# Patient Record
Sex: MALE | Race: WHITE | NOT HISPANIC OR LATINO | Employment: OTHER | URBAN - METROPOLITAN AREA
[De-identification: names, ages, dates, MRNs, and addresses within clinical notes are randomized per-mention and may not be internally consistent; named-entity substitution may affect disease eponyms.]

---

## 2022-06-30 ENCOUNTER — TRANSFERRED RECORDS (OUTPATIENT)
Dept: HEALTH INFORMATION MANAGEMENT | Facility: CLINIC | Age: 77
End: 2022-06-30

## 2022-07-25 ENCOUNTER — TRANSFERRED RECORDS (OUTPATIENT)
Dept: HEALTH INFORMATION MANAGEMENT | Facility: CLINIC | Age: 77
End: 2022-07-25

## 2022-08-03 ENCOUNTER — TELEPHONE (OUTPATIENT)
Dept: ORTHOPEDICS | Facility: CLINIC | Age: 77
End: 2022-08-03

## 2022-08-03 ENCOUNTER — MEDICAL CORRESPONDENCE (OUTPATIENT)
Dept: HEALTH INFORMATION MANAGEMENT | Facility: CLINIC | Age: 77
End: 2022-08-03

## 2022-08-03 NOTE — TELEPHONE ENCOUNTER
Action August 10, 2022 2:00 PM MT   Action Taken Pathology slides received and sent to pathology lab.     Action August 4, 2022 9:06 AM MT   Action Taken Sent a request for pathology slides from  [facility] [phone] with [employer] (Tracking #: [id number] )   Sent a request to  [phone] for images.     8/3/2022 4:21pm Fax request sent to [facility] ([phone]) for med recs. -[facility]     4:36pm Fax request sent to [facility] ([phone]) for images. 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 Watson ([facility])    APPOINTMENT DATE: 8/8/2022, 9 AM    NOTES STATUS DETAILS   OFFICE NOTE from referring provider Care Everywhere/Media Tab Tre Henson MD  - [facility]   LABS     CBC/DIFF Care Everywhere 7/12/2022   CULTURES Care Everywhere 7/9/2022   MRI PACS MR Radius Ulna Left: 6/13/2022 ([facility])      ULTRASOUND PACS US Upper Extremity Left Soft Tissue: 6/6/2022 ([facility])      TUMOR     PATHOLOGY  Slides & report Received: 08/10/2022 [facility]  Case: [id number]       Collected: 07/25/2022  Specimen: Arm, left, Left forearm mass

## 2022-08-03 NOTE — TELEPHONE ENCOUNTER
[facility] Call Center    Phone Message    May a detailed message be left on voicemail: yes     Reason for Call: Appointment Intake    Referring Provider Name: Dr. Tre Henson MD @ [facility]  Diagnosis and/or Symptoms: L Forearm Sarcoma     Action Taken: Message routed to:  [facility] ortho    Travel Screening: Not Applicable     Records and images are being sent , one received please evaluate to determine if pt needs to be seen sooner

## 2022-08-03 NOTE — TELEPHONE ENCOUNTER
ATC LV @ home phone number to schedule appt. Provided call center number.     ATC spoke to pt who was unaware of his diagnosis. Pt confirmed appt date/time/location. ATC mailed appt reminder to address on file.     Pt had biopsy diagnosis of Sarcoma, pt needs to be seen ASAP. Pt denied appt on 8/4/22 due to 'not discussing with the referring provider yet'.     Steffanie Cloud ATC

## 2022-08-05 ENCOUNTER — TRANSCRIBE ORDERS (OUTPATIENT)
Dept: OTHER | Age: 77
End: 2022-08-05

## 2022-08-05 DIAGNOSIS — R22.30: Primary | ICD-10-CM

## 2022-08-05 DIAGNOSIS — R22.30 ARM MASS: Primary | ICD-10-CM

## 2022-08-08 ENCOUNTER — OFFICE VISIT (OUTPATIENT)
Dept: ORTHOPEDICS | Facility: CLINIC | Age: 77
End: 2022-08-08
Payer: MEDICARE

## 2022-08-08 ENCOUNTER — ANCILLARY PROCEDURE (OUTPATIENT)
Dept: GENERAL RADIOLOGY | Facility: CLINIC | Age: 77
End: 2022-08-08
Attending: ORTHOPAEDIC SURGERY
Payer: MEDICARE

## 2022-08-08 ENCOUNTER — TELEPHONE (OUTPATIENT)
Dept: ORTHOPEDICS | Facility: CLINIC | Age: 77
End: 2022-08-08

## 2022-08-08 ENCOUNTER — PRE VISIT (OUTPATIENT)
Dept: ORTHOPEDICS | Facility: CLINIC | Age: 77
End: 2022-08-08

## 2022-08-08 VITALS — HEIGHT: 71 IN | WEIGHT: 270 LBS | BODY MASS INDEX: 37.8 KG/M2

## 2022-08-08 DIAGNOSIS — R22.30 ARM MASS: ICD-10-CM

## 2022-08-08 DIAGNOSIS — R22.32 MASS OF LEFT FOREARM: Primary | ICD-10-CM

## 2022-08-08 DIAGNOSIS — C96.4: ICD-10-CM

## 2022-08-08 LAB
APPEARANCE FLD: ABNORMAL
CELL COUNT BODY FLUID SOURCE: ABNORMAL
COLOR FLD: ABNORMAL
EOSINOPHIL NFR FLD MANUAL: 1 %
LYMPHOCYTES NFR FLD MANUAL: 73 %
MONOS+MACROS NFR FLD MANUAL: 21 %
NEUTS BAND NFR FLD MANUAL: 5 %
WBC # FLD AUTO: 879 /UL

## 2022-08-08 PROCEDURE — 87070 CULTURE OTHR SPECIMN AEROBIC: CPT | Performed by: ORTHOPAEDIC SURGERY

## 2022-08-08 PROCEDURE — 20605 DRAIN/INJ JOINT/BURSA W/O US: CPT | Mod: LT | Performed by: ORTHOPAEDIC SURGERY

## 2022-08-08 PROCEDURE — 73090 X-RAY EXAM OF FOREARM: CPT | Mod: LT | Performed by: RADIOLOGY

## 2022-08-08 PROCEDURE — 99203 OFFICE O/P NEW LOW 30 MIN: CPT | Mod: 25 | Performed by: ORTHOPAEDIC SURGERY

## 2022-08-08 PROCEDURE — 87075 CULTR BACTERIA EXCEPT BLOOD: CPT | Performed by: ORTHOPAEDIC SURGERY

## 2022-08-08 PROCEDURE — 89051 BODY FLUID CELL COUNT: CPT | Performed by: ORTHOPAEDIC SURGERY

## 2022-08-08 RX ORDER — MULTIPLE VITAMINS W/ MINERALS TAB 9MG-400MCG
1 TAB ORAL DAILY
COMMUNITY

## 2022-08-08 RX ORDER — ASPIRIN 81 MG/1
TABLET, CHEWABLE ORAL
COMMUNITY
End: 2022-08-22

## 2022-08-08 RX ORDER — LANSOPRAZOLE 30 MG/1
30 CAPSULE, DELAYED RELEASE ORAL DAILY
COMMUNITY

## 2022-08-08 NOTE — PROGRESS NOTES
[facility], Orthopaedic Oncology Surgery Consultation  by Velasquez Chawla M.D.    Jose Tiwari MRN# [id number]    YOB: 1945     Requesting physician: Tre Mcintyre            Assessment and Plan:   Assessment:  76yo M with L forearm pleomorphic sarcoma, s/p excision with positive margins (7/25/2022)     Plan:  1. Obtain pathology slides (Kayla to joycein)  2. PET-CT scan (Nevaeh to schedule)  3. Consult to radiation oncology ([facility] to schedule)  4. If PET-CT is negative for mets, will plan for wide re-excision a month after radiation  5. Will probably need skin graft after re-excision. We should arrange for plastic surgery consult during interim before next surgery.  6. Aspiration of the left forearm performed today    PROCEDURE DATE: 8/8/2022    PROCEDURE NOTE:    After obtaining informed consent, under sterile precautions, the left forearm was aspirated.  There were no complications.  15 ml of body fluid was obtained and sent for analysis.    Bryanna Mi MD  Adult Recon Surgery Fellow    Attending MD (Dr. Velasquez Chawla) Attestation :  This patient was seen and evaluated by me including a history, exam, and interpretation of all imaging and/or lab data.  I formulated the treatment plan along with either a physician's assistant (PA-C) or training physician (resident/fellow), who also saw the patient. That individual or a scribe has documented the visit in the attached note which I approve.  I also performed the procedure.      MD Abner Tucker Professor  Oncology and Adult Reconstructive Surgery  Dept Orthopaedic Surgery, [facility]  [phone] office, [phone] pager  [email or web address]                 History of Present Illness:   77 year old RHD retired male presents to the office for the first time today for an evaluation of left forearm painless mass. The patient states that the mass has been around for about 6 months to 1 year, without  previous injury. Dr. Henson excised the mass on 7/25/2022. 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   Currently, the patient has only incisional pain, with no functional deficits.     The patient denies fevers/chills. Denies numbness/tingling/weakness. Denies weight loss. 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      Current symptoms:  Problem: Left forearm mass  Onset and duration: 6mo -1yr  Awakens from sleep due to sx's:  No  Precipitating Injury:  No    Other joints or sites painful:  No  Fever: No  Appetite change or weight loss: No  History of prior or existing cancer: Yes      TREATMENTS:  1. 7/25/2022: Left forearm mass excision (Dr. 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 8/8/2022: Left forearm aspiration             Physical Exam:     EXAMINATION pertinent findings:   PSYCH: Pleasant, healthy-appearing, alert, oriented x3, cooperative. Normal mood and affect.  VITAL SIGNS: There were no vitals taken for this visit..  Reviewed nursing intake notes.   There is no height or weight on file to calculate BMI.  RESP: non labored breathing   ABD: benign, soft, non-tender, no acute peritoneal findings  SKIN: grossly normal   LYMPHATIC: grossly normal, no adenopathy, no extremity edema  NEURO: grossly normal , no motor deficits  VASCULAR: satisfactory perfusion of all extremities   MUSCULOSKELETAL:   LUE:  Skin intact. Incision well-healing  There is still a mass in the mid-forearm around the ulna  No TTP or erythema  Full elbow and wrist ROM  Fires AIN/PIN/U  SILT M/R/U  WWP                       Data:   All laboratory data reviewed  All imaging studies reviewed by me  MRI of the left forearm from 6/13/2022 reviewed in the office showing soft tissue mass around the mid-ulna.         DATA for DOCUMENTATION:         Past Medical History:   There is no problem list on file for  this patient.    No past medical history on file.    Also see scanned health assessment forms.       Past Surgical History:   No past surgical history on file.         Social History:     Social History     Socioeconomic History     Marital status:      Spouse name: Not on file     Number of children: Not on file     Years of education: Not on file     Highest education level: Not on file   Occupational History     Not on file   Tobacco Use     Smoking status: Not on file     Smokeless tobacco: Not on file   Substance and Sexual Activity     Alcohol use: Not on file     Drug use: Not on file     Sexual activity: Not on file   Other Topics Concern     Not on file   Social History Narrative     Not on file     Social Determinants of Health     Financial Resource Strain: Not on file   Food Insecurity: Not on file   Transportation Needs: Not on file   Physical Activity: Not on file   Stress: Not on file   Social Connections: Not on file   Intimate Partner Violence: Not on file   Housing Stability: Not on file            Family History:     No family history on file.         Medications:     Current Outpatient Medications   Medication Sig     aspirin (ASA) 81 MG chewable tablet      Aspirin 81 MG CAPS Take 1 tablet by mouth daily     LANsoprazole (PREVACID) 30 MG DR capsule      Misc Natural Products (OSTEO BI-FLEX ADV TRIPLE ST PO)      multivitamin w/minerals (MULTI-VITAMIN) tablet      No current facility-administered medications for this visit.              Review of Systems:   A comprehensive 10 point review of systems (constitutional, ENT, cardiac, peripheral vascular, lymphatic, respiratory, GI, , Musculoskeletal, skin, Neurological) was performed and found to be negative except as described in this note.     See intake form completed by patient    Answers for HPI/ROS submitted by the patient on 8/7/2022  General Symptoms: No  Skin Symptoms: No  HENT Symptoms: No  EYE SYMPTOMS: No  HEART SYMPTOMS:  No  LUNG SYMPTOMS: No  INTESTINAL SYMPTOMS: No  URINARY SYMPTOMS: No  REPRODUCTIVE SYMPTOMS: No  SKELETAL SYMPTOMS: No  BLOOD SYMPTOMS: No  NERVOUS SYSTEM SYMPTOMS: No  MENTAL HEALTH SYMPTOMS: No    Ki Mi MD  Adult Reconstruction Fellow

## 2022-08-08 NOTE — TELEPHONE ENCOUNTER
----- Message from Kayla May RN sent at 8/8/2022  2:18 PM CDT -----  Regarding: Plastic Surgery Consult Needed  Daniel Herring,    Could you please enter an order for a Plastic Surgery Referral.  Reason for visit: Patient will probably need skin graft after re-excision.  Patient with a history of a Left forearm pleomorphic sarcoma, s/p excision with positive margins (7/25/2022)    Thank you :)  Kayla

## 2022-08-08 NOTE — LETTER
8/8/2022         RE: Jose Tiwari  [address] [facility] [address]        Dear Colleague,    Thank you for referring your patient, Jose Tiwari, to the [facility] [place]. Please see a copy of my visit note below.        [facility] Phys[facility], Orthopaedic Oncology Surgery Consultation  by Velasquez Chawla M.D.    Jose Tiwari MRN# [id number]    YOB: 1945     Requesting physician: Tre Mcintyre            Assessment and Plan:   Assessment:  76yo M with L forearm pleomorphic sarcoma, s/p excision with positive margins (7/25/2022)     Plan:  1. Obtain pathology slides (Kayla to cristina)  2. PET-CT scan (Nevaeh to schedule)  3. Consult to radiation oncology ([facility] to schedule)  4. If PET-CT is negative for mets, will plan for wide re-excision a month after radiation  5. Will probably need skin graft after re-excision. We should arrange for plastic surgery consult during interim before next surgery.  6. Aspiration of the left forearm performed today    PROCEDURE DATE: 8/8/2022    PROCEDURE NOTE:    After obtaining informed consent, under sterile precautions, the left forearm was aspirated.  There were no complications.  15 ml of body fluid was obtained and sent for analysis.    Bryanna Mi MD  Adult Recon Surgery Fellow    Attending MD (Dr. Velasquez Chawla) Attestation :  This patient was seen and evaluated by me including a history, exam, and interpretation of all imaging and/or lab data.  I formulated the treatment plan along with either a physician's assistant (PATANI) or training physician (resident/fellow), who also saw the patient. That individual or a scribe has documented the visit in the attached note which I approve.  I also performed the procedure.      MD Abner Tucker Family Professor  Oncology and Adult Reconstructive Surgery  Dept Orthopaedic Surgery, [facility]  [phone] office, [phone] pager  [email or web address]                  History of Present Illness:   77 year old RHD retired male presents to the office for the first time today for an evaluation of left forearm painless mass. The patient states that the mass has been around for about 6 months to 1 year, without previous injury. Dr. Henson excised the mass on 7/25/2022. 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   Currently, the patient has only incisional pain, with no functional deficits.     The patient denies fevers/chills. Denies numbness/tingling/weakness. Denies weight loss. 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      Current symptoms:  Problem: Left forearm mass  Onset and duration: 6mo -1yr  Awakens from sleep due to sx's:  No  Precipitating Injury:  No    Other joints or sites painful:  No  Fever: No  Appetite change or weight loss: No  History of prior or existing cancer: Yes      TREATMENTS:  1. 7/25/2022: Left forearm mass excision (Dr. 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 8/8/2022: Left forearm aspiration             Physical Exam:     EXAMINATION pertinent findings:   PSYCH: Pleasant, healthy-appearing, alert, oriented x3, cooperative. Normal mood and affect.  VITAL SIGNS: There were no vitals taken for this visit..  Reviewed nursing intake notes.   There is no height or weight on file to calculate BMI.  RESP: non labored breathing   ABD: benign, soft, non-tender, no acute peritoneal findings  SKIN: grossly normal   LYMPHATIC: grossly normal, no adenopathy, no extremity edema  NEURO: grossly normal , no motor deficits  VASCULAR: satisfactory perfusion of all extremities   MUSCULOSKELETAL:   LUE:  Skin intact. Incision well-healing  There is still a mass in the mid-forearm around the ulna  No TTP or erythema  Full elbow and wrist ROM  Fires AIN/PIN/U  SILT M/R/U  WWP                       Data:   All laboratory data  reviewed  All imaging studies reviewed by me  MRI of the left forearm from 6/13/2022 reviewed in the office showing soft tissue mass around the mid-ulna.         DATA for DOCUMENTATION:         Past Medical History:   There is no problem list on file for this patient.    No past medical history on file.    Also see scanned health assessment forms.       Past Surgical History:   No past surgical history on file.         Social History:     Social History     Socioeconomic History     Marital status:      Spouse name: Not on file     Number of children: Not on file     Years of education: Not on file     Highest education level: Not on file   Occupational History     Not on file   Tobacco Use     Smoking status: Not on file     Smokeless tobacco: Not on file   Substance and Sexual Activity     Alcohol use: Not on file     Drug use: Not on file     Sexual activity: Not on file   Other Topics Concern     Not on file   Social History Narrative     Not on file     Social Determinants of Health     Financial Resource Strain: Not on file   Food Insecurity: Not on file   Transportation Needs: Not on file   Physical Activity: Not on file   Stress: Not on file   Social Connections: Not on file   Intimate Partner Violence: Not on file   Housing Stability: Not on file            Family History:     No family history on file.         Medications:     Current Outpatient Medications   Medication Sig     aspirin (ASA) 81 MG chewable tablet      Aspirin 81 MG CAPS Take 1 tablet by mouth daily     LANsoprazole (PREVACID) 30 MG DR capsule      Misc Natural Products (OSTEO BI-FLEX ADV TRIPLE ST PO)      multivitamin w/minerals (MULTI-VITAMIN) tablet      No current facility-administered medications for this visit.              Review of Systems:   A comprehensive 10 point review of systems (constitutional, ENT, cardiac, peripheral vascular, lymphatic, respiratory, GI, , Musculoskeletal, skin, Neurological) was performed and  found to be negative except as described in this note.     See intake form completed by patient    Answers for HPI/ROS submitted by the patient on 8/7/2022  General Symptoms: No  Skin Symptoms: No  HENT Symptoms: No  EYE SYMPTOMS: No  HEART SYMPTOMS: No  LUNG SYMPTOMS: No  INTESTINAL SYMPTOMS: No  URINARY SYMPTOMS: No  REPRODUCTIVE SYMPTOMS: No  SKELETAL SYMPTOMS: No  BLOOD SYMPTOMS: No  NERVOUS SYSTEM SYMPTOMS: No  MENTAL HEALTH SYMPTOMS: No    Ki Mi MD  Adult Reconstruction Fellow      Again, thank you for allowing me to participate in the care of your patient.        Sincerely,        Velasquez Chawla MD

## 2022-08-08 NOTE — LETTER
8/8/2022       RE: Jose Tiwari  [address] [facility] [address]    Dear Colleague,    Thank you for referring your patient, Jose Tiwari, to the [facility] [place]. Please see a copy of my visit note below.        [facility], Orthopaedic Oncology Surgery Consultation  by Velasquez Chawla M.D.    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 Obtain pathology slides (Kayla to cristina)  2. PET-CT scan (Nevaeh to schedule)  3. Consult to radiation oncology ([facility] to schedule)  4. If PET-CT is negative for mets, will plan for wide re-excision a month after radiation  5. Will probably need skin graft after re-excision. We should arrange for plastic surgery consult during interim before next surgery.  6. Aspiration of the left forearm performed today    PROCEDURE DATE: 8/8/2022    PROCEDURE NOTE:    After obtaining informed consent, under sterile precautions, the left forearm was aspirated.  There were no complications.  15 ml of body fluid was obtained and sent for analysis.    Bryanna Mi MD  Adult Recon Surgery Fellow    Attending MD (Dr. Velasquez Chawla) Attestation :  This patient was seen and evaluated by me including a history, exam, and interpretation of all imaging and/or lab data.  I formulated the treatment plan along with either a physician's assistant (PATANI) or training physician (resident/fellow), who also saw the patient. That individual or a scribe has documented the visit in the attached note which I approve.  I also performed the procedure.      MD Abner Tucker Family Professor  Oncology and Adult Reconstructive Surgery  Dept Orthopaedic Surgery, [facility]  [phone] office, [phone] pager  [email or web address]                 General Surgery  Hospital Follow-Up Office Visit    CC: Follow-up acute sigmoid diverticulitis    HPI: The patient is a pleasant 39 y.o. year-old gentleman who presents today for follow-up of a recent hospitalization for acute sigmoid diverticulitis that required hospitalization for about 48 hours.  That was his first episode of diverticulitis, and involved the mid sigmoid colon which was located in the right lower quadrant.  The right lower quadrant pains he had been having prior to admission have completely resolved and he denies any trouble voiding or defecating.  He denies any nausea, vomiting, fevers, or chills.    Past Medical History:   Factor V Leiden     Past Surgical History:   Tonsillectomy  Hand surgery     Medications: Xarelto 20 mg daily     Allergies: No known drug allergies     Social History: , nonsmoker, no regular alcohol use, works as the  for [employer]     Family History: Maternal grandmother with colon cancer, no family history of inflammatory bowel disease    ROS:   Constitutional: Negative for fevers or chills  HENT: Negative for hearing loss or runny nose  Eyes: Negative for vision changes or scleral icterus  Respiratory: Negative for cough or shortness of breath  Cardiovascular: Negative for chest pain or heart palpitations  Gastrointestinal: Negative for abdominal pain, nausea, vomiting, constipation, melena, or hematochezia  Genitourinary: Negative for hematuria or dysuria  Musculoskeletal: Negative for joint pain or back pain  Neurologic: Negative for headaches or dizziness  Psychiatric: Negative for anxiety or depression  All other systems reviewed and negative    Physical Exam:  Vitals:    12/11/18 1028   Pulse: 74   SpO2: 98%     General: No acute distress, well-nourished & well-developed  HEAD: normocephalic, atraumatic  EYES: normal conjunctiva, sclera anicteric  EARS: grossly normal hearing  NECK: supple, no thyromegaly  CARDIOVASCULAR: regular rate and   History of Present Illness:   77 year old RHD retired male presents to the office for the first time today for an evaluation of left forearm painless mass. The patient states that the mass has been around for about 6 months to 1 year, without previous injury. Dr. Henson excised the mass on 7/25/2022. 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   Currently, the patient has only incisional pain, with no functional deficits.     The patient denies fevers/chills. Denies numbness/tingling/weakness. Denies weight loss. 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      Current symptoms:  Problem: Left forearm mass  Onset and duration: 6mo -1yr  Awakens from sleep due to sx's:  No  Precipitating Injury:  No    Other joints or sites painful:  No  Fever: No  Appetite change or weight loss: No  History of prior or existing cancer: Yes      TREATMENTS:  1. 7/25/2022: Left forearm mass excision (Dr. 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 8/8/2022: Left forearm aspiration             Physical Exam:     EXAMINATION pertinent findings:   PSYCH: Pleasant, healthy-appearing, alert, oriented x3, cooperative. Normal mood and affect.  VITAL SIGNS: There were no vitals taken for this visit..  Reviewed nursing intake notes.   There is no height or weight on file to calculate BMI.  RESP: non labored breathing   ABD: benign, soft, non-tender, no acute peritoneal findings  SKIN: grossly normal   LYMPHATIC: grossly normal, no adenopathy, no extremity edema  NEURO: grossly normal , no motor deficits  VASCULAR: satisfactory perfusion of all extremities   MUSCULOSKELETAL:   LUE:  Skin intact. Incision well-healing  There is still a mass in the mid-forearm around the ulna  No TTP or erythema  Full elbow and wrist ROM  Fires AIN/PIN/U  SILT M/R/U  WWP                       Data:   All laboratory data  rhythm  RESPIRATORY: clear to auscultation bilaterally  GASTROINTESTINAL: soft, nontender, non-distended  MUSCULOSKELETAL: normal gait and station. No gross extremity abnormalities  PSYCHIATRIC: oriented x3, normal mood and affect    IMAGING:  CT ABD/PELVIS (11/13/2018):  1. Within the right lower quadrant, there is an inflamed segment of proximal sigmoid colon, with adjacent pericolonic soft tissue stranding and trace free fluid. Findings are favored to represent diverticulitis. No adjacent fluid collection is seen to suggest perforation with abscess formation. This process immediately abuts some adjacent loops of small bowel, but does not appear to result in any small bowel obstruction. Process is also contiguous with the patient's urinary bladder which does appear thick walled superiorly.  2. No evidence of acute appendicitis.    ASSESSMENT & PLAN  Mr. Fam is a 39-year-old gentleman with a recent episode of acute uncomplicated sigmoid diverticulitis that responded well to conservative management.  I advised him to begin taking a fiber supplement daily and would like to proceed with colonoscopy in about 6-8 weeks.  I discussed the risks of the procedure to include bleeding, possible colon perforation, and possible missed pathology.  Despite these risks, he has consented to proceed and I'm happy to get it scheduled for late January or early February 2019.    Savana Castro MD  General and Endoscopic Surgery  [facility]    [address], [address]  [place], [place], [address]  P: [phone]  F: [phone]      reviewed  All imaging studies reviewed by me  MRI of the left forearm from 6/13/2022 reviewed in the office showing soft tissue mass around the mid-ulna.         DATA for DOCUMENTATION:         Past Medical History:   There is no problem list on file for this patient.    No past medical history on file.    Also see scanned health assessment forms.       Past Surgical History:   No past surgical history on file.         Social History:     Social History     Socioeconomic History     Marital status:      Spouse name: Not on file     Number of children: Not on file     Years of education: Not on file     Highest education level: Not on file   Occupational History     Not on file   Tobacco Use     Smoking status: Not on file     Smokeless tobacco: Not on file   Substance and Sexual Activity     Alcohol use: Not on file     Drug use: Not on file     Sexual activity: Not on file   Other Topics Concern     Not on file   Social History Narrative     Not on file     Social Determinants of Health     Financial Resource Strain: Not on file   Food Insecurity: Not on file   Transportation Needs: Not on file   Physical Activity: Not on file   Stress: Not on file   Social Connections: Not on file   Intimate Partner Violence: Not on file   Housing Stability: Not on file            Family History:     No family history on file.         Medications:     Current Outpatient Medications   Medication Sig     aspirin (ASA) 81 MG chewable tablet      Aspirin 81 MG CAPS Take 1 tablet by mouth daily     LANsoprazole (PREVACID) 30 MG DR capsule      Misc Natural Products (OSTEO BI-FLEX ADV TRIPLE ST PO)      multivitamin w/minerals (MULTI-VITAMIN) tablet      No current facility-administered medications for this visit.          Review of Systems:   A comprehensive 10 point review of systems (constitutional, ENT, cardiac, peripheral vascular, lymphatic, respiratory, GI, , Musculoskeletal, skin, Neurological) was performed and found  to be negative except as described in this note.     See intake form completed by patient    Answers for HPI/ROS submitted by the patient on 8/7/2022  General Symptoms: No  Skin Symptoms: No  HENT Symptoms: No  EYE SYMPTOMS: No  HEART SYMPTOMS: No  LUNG SYMPTOMS: No  INTESTINAL SYMPTOMS: No  URINARY SYMPTOMS: No  REPRODUCTIVE SYMPTOMS: No  SKELETAL SYMPTOMS: No  BLOOD SYMPTOMS: No  NERVOUS SYSTEM SYMPTOMS: No  MENTAL HEALTH SYMPTOMS: No    Ki Mi MD        Adult Reconstruction Fellow

## 2022-08-10 ENCOUNTER — TELEPHONE (OUTPATIENT)
Dept: ORTHOPEDICS | Facility: CLINIC | Age: 77
End: 2022-08-10

## 2022-08-10 NOTE — TELEPHONE ENCOUNTER
-A call was placed to the Radiation Oncology clinic.    -First, tried Jackie, nurse manager, who has been taking care of all new patient referrals, but she is out of the office through 8/11/22.    -Called the main clinic line: [phone] and spoke to someone over the phone who sent a message to Marlene, who is covering for Jackie while she is away.    -A message was sent to Marlene, to have this patient seen ASAP- hopefully within the next 1-2 weeks.    -Will await more information on that.    Kayla May, CHIDI  8/10/2022 2:24 PM

## 2022-08-11 ENCOUNTER — LAB REQUISITION (OUTPATIENT)
Dept: LAB | Facility: CLINIC | Age: 77
End: 2022-08-11
Payer: MEDICARE

## 2022-08-13 LAB — BACTERIA ASPIRATE CULT: NO GROWTH

## 2022-08-15 ENCOUNTER — HOSPITAL ENCOUNTER (OUTPATIENT)
Dept: PET IMAGING | Facility: CLINIC | Age: 77
Discharge: HOME | End: 2022-08-15
Attending: ORTHOPAEDIC SURGERY | Admitting: ORTHOPAEDIC SURGERY
Payer: MEDICARE

## 2022-08-15 DIAGNOSIS — C96.4: ICD-10-CM

## 2022-08-15 DIAGNOSIS — R22.32 MASS OF LEFT FOREARM: ICD-10-CM

## 2022-08-15 LAB — BACTERIA ASPIRATE CULT: NORMAL

## 2022-08-15 PROCEDURE — 343N000001 HC RX 343: Performed by: ORTHOPAEDIC SURGERY

## 2022-08-15 PROCEDURE — 78816 PET IMAGE W/CT FULL BODY: CPT | Mod: 26 | Performed by: RADIOLOGY

## 2022-08-15 PROCEDURE — A9552 F18 FDG: HCPCS | Performed by: ORTHOPAEDIC SURGERY

## 2022-08-15 PROCEDURE — G1010 CDSM STANSON: HCPCS | Mod: PI

## 2022-08-15 PROCEDURE — 250N000011 HC RX IP 250 OP 636: Performed by: ORTHOPAEDIC SURGERY

## 2022-08-15 PROCEDURE — 74177 CT ABD & PELVIS W/CONTRAST: CPT

## 2022-08-15 PROCEDURE — 74177 CT ABD & PELVIS W/CONTRAST: CPT | Mod: 26 | Performed by: RADIOLOGY

## 2022-08-15 PROCEDURE — 71260 CT THORAX DX C+: CPT | Mod: 26 | Performed by: RADIOLOGY

## 2022-08-15 PROCEDURE — G1010 CDSM STANSON: HCPCS | Mod: GC | Performed by: RADIOLOGY

## 2022-08-15 RX ORDER — IOPAMIDOL 755 MG/ML
45-135 INJECTION, SOLUTION INTRAVASCULAR ONCE
Status: COMPLETED | OUTPATIENT
Start: 2022-08-15 | End: 2022-08-15

## 2022-08-15 RX ADMIN — IOPAMIDOL 135 ML: 755 INJECTION, SOLUTION INTRAVENOUS at 15:33

## 2022-08-15 RX ADMIN — FLUDEOXYGLUCOSE F-18 16.15 MCI.: 500 INJECTION, SOLUTION INTRAVENOUS at 13:57

## 2022-08-15 NOTE — PROGRESS NOTES
"Radiation Oncology Consultation:  Date on this visit: 8/17/2022    Jose Tiwari  is referred by Dr.Edward SEAR Chawla for a radiation oncology consultation. He requires evaluation for diagnosis of LEFT forearm high grade sarcoma.       History Of Present Illness:  Mr. Tiwari is a 77 year old  right handed male who presents with a diagnosis of sarcoma.    The patient states that the mass had been around for about 6 months to 1 year, without previous injury.       He underwent Ultrasound and MRI of the lesion.    THe MRI showed a small poorly marginated but measured on the order of 1.4 x 0.9 x 1.7 cm lesion.  ( see image bleow)    Dr. Henson excised the mass on 7/25/2022. 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       He was referred to DR Chawla who has ordered at PET scan  and referred him for possible \"  preoperative \" RT with plan for a re-exision.        Past Medical/Surgical History:  No past medical history on file.  Past Surgical History:   Procedure Laterality Date     KNEE SURGERY  2010    both knees past 15 yrs   Traumatic amputaiton of finger 2009   Tongue cancer tx with surgery  2014  no radiation , no recurrence  GERD        Past Radiation History: none  Past Chemotherapy History:none   Implanted Cardiac device:   none  Advanced directive  :  no    Review of Systems:  Reviewed.  See details in nursing note    Allergies:  Allergies as of 08/17/2022 - Reviewed 08/08/2022   Allergen Reaction Noted     Atorvastatin  01/15/2014     No clinical screening - see comments Unknown 01/15/2014     Omeprazole Other (See Comments) 04/13/2016     Pantoprazole Other (See Comments) 02/12/2016     Ciprofloxacin Rash 09/26/2016     Penicillins Rash 12/23/2008     Sulfa drugs Rash 10/04/2016       Current Medications:     Current Outpatient Medications:      aspirin (ASA) 81 MG chewable tablet, , Disp: , Rfl:      Aspirin 81 MG CAPS, Take 1 tablet by mouth daily, Disp: , Rfl:      LANsoprazole " (PREVACID) 30 MG DR capsule, , Disp: , Rfl:      Misc Natural Products (OSTEO BI-FLEX ADV TRIPLE ST PO), , Disp: , Rfl:      multivitamin w/minerals (THERA-VIT-M) tablet, , Disp: , Rfl:     Family History:  Family History   Problem Relation Age of Onset     Diabetes Mother              Breast Cancer Mother              Obesity Mother              Hypertension Father                Social History:  accomanied by his wife,    no tobacco now,mayra in   occassional etoh      Physical Exam:  BP (!) 147/81 (BP Location: Right arm)   Pulse 86   Resp 16   Wt 98.8 kg (217 lb 12.8 oz)   SpO2 98%   BMI 30.38 kg/m      GENERAL APPEARANCE: healthy, alert and no distress.     HENT: Mouth without ulcers or lesions. RIght lateral tongue with evidence of prior surgery,  Mucosa pink and moist.  Teeth in good repair    NECK: no adenopathy, no asymmetry or masses   LYMPHATICS: No cervical, supraclavicular, axillary or inguinal lymphadenopathy   RESP: lungs clear to auscultation - no rales, rhonchi or wheezes  CARDIOVASCULAR: regular rates and rhythm, normal S1 S2, no S3 or S4 and no murmur.   ABDOMEN:  soft, nontender, no HSM or masses and bowel sounds normal     MUSCULOSKELETAL: left forearm   with 3 cm scar lateral aspect.  Firm area under the scar with cosistency of fluid.   THe borders are  No nodularity.         extremities normal- no gross deformities noted, no evidence of inflammation in joints, FROM in all extremities. No edema b/l LE.  SKIN: no suspicious lesions or rashes   PSYCHIATRIC: mentation appears normal and affect normal      [facility]   Soft tissue, left forearm mass, excision:    Pleomorphic sarcoma, FNCLCC grade 2 of 3 extending to the specimen surgical margins focally      Laboratory/Imaging Studies  Ultrasound   22:  Imaging of the palpable area along the left mid forearm demonstrates a heterogeneous irregular hypoechoic  "area within the deep subcutaneous tissues marginating the muscle with blood flow along the peripheral margin which could be a solid mass measuring 1.9 x 0.7 x 2.5 cm. Further evaluation with MRI imaging with and without contrast is suggested.     CXR:  7/9/22 :  Shallow inspiration with strands of atelectasis or scarring both lungs. Calcified aortic atherosclerosis. Mild degenerative change thoracic spine. No change from prior    CT abd and pelvis   7/9/22:   No acute abnormalities.     MRI of left arm   6/13/22 :   SUBCUTANEOUS   Low T1 and high T2 signal intensity lesion along the ulnar side of the forearm corresponding to the patient's palpable abnormality. This is poorly marginated but measures on the order of 1.4 x 0.9 x 1.7 cm and does exhibit contrast enhancement. It elicits some surrounding inflammation or edema within the subcutaneous soft tissues, but there is no evidence for extension of the lesion or reactive edema deep to the myofascial margin. It remains indeterminate. It could represent a focal area of inflammation such as a granulomatous inflammatory process.      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        Surgery alone ( with another resection ) has been reported to have at least a 40 % recurrence rate even with a negative margin   ( Jimmy et al   IJROBP 2000)     I am recommending 50 Gy in 25 fractions using a conformal approach.    The risks and benefits of radiotherapy were discussed with the patient expecially the possiblity of wound healing delay.   .  Potential acute and long term side effects were explained.   The patient agrees to proceed with radiation therapy.  A written consent was obtained.    Thank you for allowing me to participate in Jose Tiwari 's care .  Please do " not hesitate to call me with questions.    Dominga Mims MD  [phone]   Pager   [phone]  [facility]   [phone] [place]  [phone] [facility]  Primary Physician: No primary care provider on file.   Patient Care Team:  Tre Henson MD as MD (Orthopaedic Surgery)  CARLA LAZARO

## 2022-08-17 ENCOUNTER — OFFICE VISIT (OUTPATIENT)
Dept: RADIATION ONCOLOGY | Facility: CLINIC | Age: 77
End: 2022-08-17
Attending: ORTHOPAEDIC SURGERY
Payer: MEDICARE

## 2022-08-17 ENCOUNTER — OFFICE VISIT (OUTPATIENT)
Dept: RADIATION ONCOLOGY | Facility: CLINIC | Age: 77
End: 2022-08-17
Attending: RADIOLOGY
Payer: MEDICARE

## 2022-08-17 VITALS
OXYGEN SATURATION: 98 % | DIASTOLIC BLOOD PRESSURE: 81 MMHG | BODY MASS INDEX: 30.38 KG/M2 | HEART RATE: 86 BPM | WEIGHT: 217.8 LBS | RESPIRATION RATE: 16 BRPM | SYSTOLIC BLOOD PRESSURE: 147 MMHG

## 2022-08-17 DIAGNOSIS — R22.32 MASS OF LEFT FOREARM: Primary | ICD-10-CM

## 2022-08-17 DIAGNOSIS — C49.12 SARCOMA OF LEFT UPPER EXTREMITY (H): Primary | ICD-10-CM

## 2022-08-17 DIAGNOSIS — R22.32 MASS OF LEFT FOREARM: ICD-10-CM

## 2022-08-17 PROCEDURE — 77334 RADIATION TREATMENT AID(S): CPT | Performed by: RADIOLOGY

## 2022-08-17 PROCEDURE — 77290 THER RAD SIMULAJ FIELD CPLX: CPT | Performed by: RADIOLOGY

## 2022-08-17 PROCEDURE — 77263 THER RADIOLOGY TX PLNG CPLX: CPT | Performed by: RADIOLOGY

## 2022-08-17 PROCEDURE — 77334 RADIATION TREATMENT AID(S): CPT | Mod: 26 | Performed by: RADIOLOGY

## 2022-08-17 PROCEDURE — 99204 OFFICE O/P NEW MOD 45 MIN: CPT | Mod: 25 | Performed by: RADIOLOGY

## 2022-08-17 PROCEDURE — 77290 THER RAD SIMULAJ FIELD CPLX: CPT | Mod: 26 | Performed by: RADIOLOGY

## 2022-08-17 PROCEDURE — G0463 HOSPITAL OUTPT CLINIC VISIT: HCPCS | Mod: 25

## 2022-08-17 ASSESSMENT — ENCOUNTER SYMPTOMS
EYES NEGATIVE: 1
NAUSEA: 1
NEUROLOGICAL NEGATIVE: 1
PSYCHIATRIC NEGATIVE: 1
RESPIRATORY NEGATIVE: 1
CARDIOVASCULAR NEGATIVE: 1
CONSTITUTIONAL NEGATIVE: 1

## 2022-08-17 ASSESSMENT — PAIN SCALES - GENERAL: PAINLEVEL: NO PAIN (0)

## 2022-08-17 NOTE — LETTER
8/17/2022         RE: Jose Tiwari  [address] [place] [id number]        Dear Colleague,    Thank you for referring your patient, Jose Tiwari, to the [facility] RADIATION ONCOLOGY. Please see a copy of my visit note below.    Radiation Therapy Patient Education    Person involved with teaching: Patient and Wife    Patient educational needs for self management of treatment-related side effects assessment completed.  EPIC Patient Ed tab contains Patient Learning Assessment    Education Materials Given  Radiation Therapy and You    Educational Topics Discussed  Side effects expected, Pain management, Skin care, Activity and When to call MD/RN    Response To Teaching  Verbalizes understanding    GYN Only  Vaginal Dilator-given and educated: N/A    Referrals sent: None    Chemotherapy?  No          Again, thank you for allowing me to participate in the care of your patient.        Sincerely,        Dominga Mims MD

## 2022-08-17 NOTE — LETTER
"  8/17/2022     RE: Jose Tiwari  [address] [place] [address]    Dear Colleague,    Thank you for referring your patient, Jose Tiwari, to the [facility] RADIATION ONCOLOGY. Please see a copy of my visit note below.    Radiation Oncology Consultation:  Date on this visit: 8/17/2022    Jose Tiwari  is referred by Dr.Edward SERA Chawla for a radiation oncology consultation. He requires evaluation for diagnosis of LEFT forearm high grade sarcoma.       History Of Present Illness:  Mr. Tiwari is a 77 year old  right handed male who presents with a diagnosis of sarcoma.    The patient states that the mass had been around for about 6 months to 1 year, without previous injury.       He underwent Ultrasound and MRI of the lesion.    THe MRI showed a small poorly marginated but measured on the order of 1.4 x 0.9 x 1.7 cm lesion.  ( see image bleow)    Dr. Henson excised the mass on 7/25/2022. 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       He was referred to DR Chawla who has ordered at PET scan  and referred him for possible \"  preoperative \" RT with plan for a re-exision.        Past Medical/Surgical History:  No past medical history on file.  Past Surgical History:   Procedure Laterality Date     KNEE SURGERY  2010    both knees past 15 yrs   Traumatic amputaiton of finger 2009   Tongue cancer tx with surgery  2014  no radiation , no recurrence  GERD        Past Radiation History: none  Past Chemotherapy History:none   Implanted Cardiac device:   none  Advanced directive  :  no    Review of Systems:  Reviewed.  See details in nursing note    Allergies:  Allergies as of 08/17/2022 - Reviewed 08/08/2022   Allergen Reaction Noted     Atorvastatin  01/15/2014     No clinical screening - see comments Unknown 01/15/2014     Omeprazole Other (See Comments) 04/13/2016     Pantoprazole Other (See Comments) 02/12/2016     Ciprofloxacin Rash 09/26/2016     Penicillins Rash 12/23/2008 "     Sulfa drugs Rash 10/04/2016       Current Medications:     Current Outpatient Medications:      aspirin (ASA) 81 MG chewable tablet, , Disp: , Rfl:      Aspirin 81 MG CAPS, Take 1 tablet by mouth daily, Disp: , Rfl:      LANsoprazole (PREVACID) 30 MG DR capsule, , Disp: , Rfl:      Misc Natural Products (OSTEO BI-FLEX ADV TRIPLE ST PO), , Disp: , Rfl:      multivitamin w/minerals (THERA-VIT-M) tablet, , Disp: , Rfl:     Family History:  Family History   Problem Relation Age of Onset     Diabetes Mother              Breast Cancer Mother              Obesity Mother              Hypertension Father                Social History:  accomanied by his wife,    no tobacco now,mayra in   occassional etoh      Physical Exam:  BP (!) 147/81 (BP Location: Right arm)   Pulse 86   Resp 16   Wt 98.8 kg (217 lb 12.8 oz)   SpO2 98%   BMI 30.38 kg/m      GENERAL APPEARANCE: healthy, alert and no distress.     HENT: Mouth without ulcers or lesions. RIght lateral tongue with evidence of prior surgery,  Mucosa pink and moist.  Teeth in good repair    NECK: no adenopathy, no asymmetry or masses   LYMPHATICS: No cervical, supraclavicular, axillary or inguinal lymphadenopathy   RESP: lungs clear to auscultation - no rales, rhonchi or wheezes  CARDIOVASCULAR: regular rates and rhythm, normal S1 S2, no S3 or S4 and no murmur.   ABDOMEN:  soft, nontender, no HSM or masses and bowel sounds normal     MUSCULOSKELETAL: left forearm   with 3 cm scar lateral aspect.  Firm area under the scar with cosistency of fluid.   THe borders are  No nodularity.         extremities normal- no gross deformities noted, no evidence of inflammation in joints, FROM in all extremities. No edema b/l LE.  SKIN: no suspicious lesions or rashes   PSYCHIATRIC: mentation appears normal and affect normal      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 Further evaluation with MRI imaging with and without contrast is suggested.     CXR:  7/9/22 :  Shallow inspiration with strands of atelectasis or scarring both lungs. Calcified aortic atherosclerosis. Mild degenerative change thoracic spine. No change from prior    CT abd and pelvis   7/9/22:   No acute abnormalities.     MRI of left arm   6/13/22 :   SUBCUTANEOUS   Low T1 and high T2 signal intensity lesion along the ulnar side of the forearm corresponding to the patient's palpable abnormality. This is poorly marginated but measures on the order of 1.4 x 0.9 x 1.7 cm and does exhibit contrast enhancement. It elicits some surrounding inflammation or edema within the subcutaneous soft tissues, but there is no evidence for extension of the lesion or reactive edema deep to the myofascial margin. It remains indeterminate. It could represent a focal area of inflammation such as a granulomatous inflammatory process.      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        Surgery alone ( with another resection ) has been reported to have at least a 40 % recurrence rate even with a negative margin   ( Jimmy et al   IJROBP 2000)     I am recommending 50 Gy in 25 fractions using a conformal approach.    The risks and benefits of radiotherapy were discussed with the patient expecially the possiblity of wound healing " delay.   .  Potential acute and long term side effects were explained.   The patient agrees to proceed with radiation therapy.  A written consent was obtained.    Thank you for allowing me to participate in Jose Tiwari 's care .  Please do not hesitate to call me with questions.    Dominga Mims MD  [phone]   Pager   [phone]  [facility]   [phone] [place]  [phone] [facility]  Primary Physician: No primary care provider on file.   Patient Care Team:  Tre Henson MD as MD (Orthopaedic Surgery)  CARLA LAZARO                  INITIAL PATIENT ASSESSMENT    Diagnosis: L forearm pleomorphic sarcoma    Prior radiation therapy: None    Prior chemotherapy: None    Prior hormonal therapy:No    Pain Eval:  Denies    Psychosocial  Living arrangements: wife   Fall Risk: independent   referral needs: Not needed    Advanced Directive: Yes - not on file with us  Implantable Cardiac Device? No    LMP: No LMP for male patient.      Nurse face-to-face time: Level 5:  over 15 min face to face time          Review of Systems   Constitutional: Negative.    HENT: Negative.    Eyes: Negative.    Respiratory: Negative.    Cardiovascular: Negative.    Gastrointestinal: Positive for nausea.        Intermittent upset stomach related to esophageal hiatal hernia   Genitourinary: Negative.    Musculoskeletal: Positive for joint pain.        Hands and knees   Skin: Negative.    Neurological: Negative.    Endo/Heme/Allergies: Negative.    Psychiatric/Behavioral: Negative.      Again, thank you for allowing me to participate in the care of your patient.      Sincerely,      Dominga Mims MD

## 2022-08-17 NOTE — PROGRESS NOTES
INITIAL PATIENT ASSESSMENT    Diagnosis: L forearm pleomorphic sarcoma    Prior radiation therapy: None    Prior chemotherapy: None    Prior hormonal therapy:No    Pain Eval:  Denies    Psychosocial  Living arrangements: wife   Fall Risk: independent   referral needs: Not needed    Advanced Directive: Yes - not on file with us  Implantable Cardiac Device? No    LMP: No LMP for male patient.      Nurse face-to-face time: Level 5:  over 15 min face to face time          Review of Systems   Constitutional: Negative.    HENT: Negative.    Eyes: Negative.    Respiratory: Negative.    Cardiovascular: Negative.    Gastrointestinal: Positive for nausea.        Intermittent upset stomach related to esophageal hiatal hernia   Genitourinary: Negative.    Musculoskeletal: Positive for joint pain.        Hands and knees   Skin: Negative.    Neurological: Negative.    Endo/Heme/Allergies: Negative.    Psychiatric/Behavioral: Negative.

## 2022-08-17 NOTE — PROGRESS NOTES
Radiation Therapy Patient Education    Person involved with teaching: Patient and Wife    Patient educational needs for self management of treatment-related side effects assessment completed.  [facility] Patient Ed tab contains Patient Learning Assessment    Education Materials Given  Radiation Therapy and You    Educational Topics Discussed  Side effects expected, Pain management, Skin care, Activity and When to call MD/RN    Response To Teaching  Verbalizes understanding    GYN Only  Vaginal Dilator-given and educated: N/A    Referrals sent: None    Chemotherapy?  No

## 2022-08-18 ENCOUNTER — MYC MEDICAL ADVICE (OUTPATIENT)
Dept: RADIATION ONCOLOGY | Facility: CLINIC | Age: 77
End: 2022-08-18

## 2022-08-18 LAB
PATH REPORT.COMMENTS IMP SPEC: ABNORMAL
PATH REPORT.COMMENTS IMP SPEC: YES
PATH REPORT.FINAL DX SPEC: ABNORMAL
PATH REPORT.GROSS SPEC: ABNORMAL
PATH REPORT.MICROSCOPIC SPEC OTHER STN: ABNORMAL
PATH REPORT.RELEVANT HX SPEC: ABNORMAL
PATH REPORT.RELEVANT HX SPEC: ABNORMAL
PATH REPORT.SITE OF ORIGIN SPEC: ABNORMAL

## 2022-08-18 PROCEDURE — 88321 CONSLTJ&REPRT SLD PREP ELSWR: CPT | Performed by: PATHOLOGY

## 2022-08-18 NOTE — RESULT ENCOUNTER NOTE
Dear Jose Tiwari:    Just a note to let you know that your PET/CT study revealed no evidence of spread of your cancer.  That is good news.     Therefore I will ask our staff to arrange for consultation with our radiation oncology team and we will plan to undertake surgical removal of the prior surgical site after a course of external beam radiation is administered.    Best regards,    Velasquez Chawla MD  [facility] Professor  Oncology and Adult Reconstructive Surgery  Dept Orthopaedic Surgery, [facility]  [phone] office  [email or web address]

## 2022-08-22 ENCOUNTER — ANCILLARY PROCEDURE (OUTPATIENT)
Dept: MRI IMAGING | Facility: CLINIC | Age: 77
End: 2022-08-22
Attending: RADIOLOGY
Payer: MEDICARE

## 2022-08-22 ENCOUNTER — OFFICE VISIT (OUTPATIENT)
Dept: ORTHOPEDICS | Facility: CLINIC | Age: 77
End: 2022-08-22
Payer: MEDICARE

## 2022-08-22 DIAGNOSIS — R22.32 MASS OF LEFT FOREARM: Primary | ICD-10-CM

## 2022-08-22 DIAGNOSIS — C49.12 SARCOMA OF LEFT UPPER EXTREMITY (H): ICD-10-CM

## 2022-08-22 PROCEDURE — 73220 MRI UPPR EXTREMITY W/O&W/DYE: CPT | Mod: LT | Performed by: RADIOLOGY

## 2022-08-22 PROCEDURE — 99213 OFFICE O/P EST LOW 20 MIN: CPT | Mod: GC | Performed by: ORTHOPAEDIC SURGERY

## 2022-08-22 PROCEDURE — A9585 GADOBUTROL INJECTION: HCPCS | Performed by: RADIOLOGY

## 2022-08-22 PROCEDURE — G1010 CDSM STANSON: HCPCS | Performed by: RADIOLOGY

## 2022-08-22 RX ORDER — GADOBUTROL 604.72 MG/ML
10 INJECTION INTRAVENOUS ONCE
Status: COMPLETED | OUTPATIENT
Start: 2022-08-22 | End: 2022-08-22

## 2022-08-22 RX ADMIN — GADOBUTROL 10 ML: 604.72 INJECTION INTRAVENOUS at 11:11

## 2022-08-22 NOTE — PROGRESS NOTES
Expand All Collapse All       [facility] Physicians, Orthopaedic Oncology Surgery Consultation  by Velasquez Chawla M.D.     Jose Tiwari MRN# [id number]     YOB: 1945      Requesting physician: Tre Henson  Justin Mcintyre      DX:   77M with L forearm UPS     TREATMENTS:  1. 7/25/2022: Left forearm mass excision (Dr. 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 8/8/2022: Left forearm seroma aspiration  3. External beam RT ([place]) [facility]             Assessment and Plan:   Assessment:  76yo M with L forearm pleomorphic sarcoma, s/p excision with positive margins (7/25/2022)     Plan:  1. PET-CT scan negative for metastasis  2. Will obtain MRI L forearm with and without contrast today  3. Starting radiation next week  4. Follow-up with gastroenterologist about pancreas updake  5. Will plan for surgical wide re-excision about 1 month after radiation ends.   6. Will possibly need skin graft after re-excision. This may require the help of plastic surgery.  7. Patient educated on the high risk of wound complications with this procedure after radiation   8. F/U after radiation     Ki Mi MD  Adult Reconstructive Surgery Fellow  Department of Orthopaedic Surgery, [facility]            History of Present Illness:   77 year old RHD retired male presents to the office for a follow-up left forearm UPS. The patient states that the mass has been around for about 6 months to 1 year, without previous injury. Dr. Henson excised the mass on 7/25/2022. 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     The patient underwent a PET-CT, which showed no definitive metastasis. There is a vertical linear uptake along the head of the pancreas. He will begin radiation starting next week.      The patient denies fevers/chills. Denies numbness/tingling/weakness. Denies weight loss. 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        Current symptoms:  Problem: Left forearm mass  Onset and duration: 6mo -1yr  Awakens from sleep due to sx's:  No  Precipitating Injury:  No    Other joints or sites painful:  No  Fever: No  Appetite change or weight loss: No  History of prior or existing cancer: Yes                Physical Exam:      EXAMINATION pertinent findings:   PSYCH: Pleasant, healthy-appearing, alert, oriented x3, cooperative. Normal mood and affect.  VITAL SIGNS: There were no vitals taken for this visit..  Reviewed nursing intake notes.   There is no height or weight on file to calculate BMI.  RESP: non labored breathing   ABD: benign, soft, non-tender, no acute peritoneal findings  SKIN: grossly normal   LYMPHATIC: grossly normal, no adenopathy, no extremity edema  NEURO: grossly normal , no motor deficits  VASCULAR: satisfactory perfusion of all extremities     MUSCULOSKELETAL:   LUE:  Skin intact. Incision well-healing  There is still a mass in the mid-forearm around the ulna. It's a firmer than last visit  No TTP or erythema  Full elbow and wrist ROM  Fires AIN/PIN/U  SILT M/R/U  WWP                  Data:   All laboratory data reviewed  All imaging studies reviewed by me  PET scan from 8/15 shows no definite metastasis. There is a vertical linear uptake along the head of the pancreas.    Total combined visit time and work time before and after clinic visit = 20 min

## 2022-08-22 NOTE — PROGRESS NOTES
[facility] Physicians, Orthopaedic Oncology Surgery Consultation  by Velasquez Chawla M.D.    Jose Tiwari MRN# [id number]    YOB: 1945     Requesting physician: Tre Henson  Justin Sawyerold     DX:   77M with L forearm UPS      TREATMENTS:  1. 7/25/2022: Left forearm mass excision (Dr. 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 8/8/2022: Left forearm seroma aspiration  3. [facility]             Assessment and Plan:   Assessment:  78yo M with L forearm pleomorphic sarcoma, s/p excision with positive margins (7/25/2022)     Plan:  1. PET-CT scan negative for metastasis  2. Will obtain MRI L forearm with and without contrast today  3. Starting radiation next week  4. Follow-up with gastroenterologist about pancreas updake  5. Will plan for surgical wide re-excision about 1 month after radiation ends.   6. Will possibly need skin graft after re-excision. This may require the help of plastic surgery.  7. Patient educated on the high risk of wound complications with this procedure after radiation   8. F/U after radiation     Ki Mi MD  Adult Reconstructive Surgery Fellow  Department of Orthopaedic Surgery, [facility]    Attending MD (Dr. Velasquez Chawla) Attestation :  This patient was seen and evaluated by me including a history, exam, and interpretation of all imaging and/or lab data.  I formulated the treatment plan along with either a physician's assistant (PA-C) or training physician (resident/fellow), who also saw the patient. That individual or a scribe has documented the visit in the attached note which I approve.    MD Abner Tucker Professor  Oncology and Adult Reconstructive Surgery  Dept Orthopaedic Surgery, [facility]  [phone] office, [phone] pager  [email or web address]                    History of Present Illness:   77 year old RHD retired male presents to the office for a follow-up  left forearm UPS. The patient states that the mass has been around for about 6 months to 1 year, without previous injury. Dr. Henson excised the mass on 7/25/2022. 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      The patient underwent a PET-CT, which showed no definitive metastasis. There is a vertical linear uptake along the head of the pancreas. He will begin radiation starting next week.      The patient denies fevers/chills. Denies numbness/tingling/weakness. Denies weight loss. 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        Current symptoms:  Problem: Left forearm mass  Onset and duration: 6mo -1yr  Awakens from sleep due to sx's:  No  Precipitating Injury:  No    Other joints or sites painful:  No  Fever: No  Appetite change or weight loss: No  History of prior or existing cancer: Yes                Physical Exam:      EXAMINATION pertinent findings:   PSYCH: Pleasant, healthy-appearing, alert, oriented x3, cooperative. Normal mood and affect.  VITAL SIGNS: There were no vitals taken for this visit..  Reviewed nursing intake notes.   There is no height or weight on file to calculate BMI.  RESP: non labored breathing   ABD: benign, soft, non-tender, no acute peritoneal findings  SKIN: grossly normal   LYMPHATIC: grossly normal, no adenopathy, no extremity edema  NEURO: grossly normal , no motor deficits  VASCULAR: satisfactory perfusion of all extremities      MUSCULOSKELETAL:   LUE:  Skin intact. Incision well-healing  There is still a mass in the mid-forearm around the ulna. It's a firmer than last visit  No TTP or erythema  Full elbow and wrist ROM  Fires AIN/PIN/U  SILT M/R/U  WWP                   Data:   All laboratory data reviewed  All imaging studies reviewed by me  PET scan from 8/15 shows no definite metastasis. There is a vertical linear uptake along the head of the pancreas.     Total combined visit  time and work time before and after clinic visit = 20 min

## 2022-08-22 NOTE — DISCHARGE INSTRUCTIONS
MRI Contrast Discharge Instructions    The IV contrast you received today will pass out of your body in your  urine. This will happen in the next 24 hours. You will not feel this process.  Your urine will not change color.    Drink at least 4 extra glasses of water or juice today (unless your doctor  has restricted your fluids). This reduces the stress on your kidneys.  You may take your regular medicines.    If you are on dialysis: It is best to have dialysis today.    If you have a reaction: Most reactions happen right away. If you have  any new symptoms after leaving the hospital (such as hives or swelling),  call your hospital at the correct number below. Or call your family doctor.  If you have breathing distress or wheezing, call 911.    Special instructions: ***    I have read and understand the above information.    Signature:______________________________________ Date:___________    Staff:__________________________________________ Date:___________     Time:__________    [facility] Departments:    ___[place]: [phone]  ___[facility]: [phone]  ___[place]: [phone] ___[facility]: [phone]  ___[facility]: [phone]  ___[facility]: [phone]  ___[phone]  ___[facility]: [phone]  ___[phone]

## 2022-08-22 NOTE — LETTER
8/22/2022         RE: Jose Tiwari  [address] [facility] [address]        Dear Colleague,    Thank you for referring your patient, Jose Tiwari, to the [facility] [place]. Please see a copy of my visit note below.        [facility] Physicians, Orthopaedic Oncology Surgery Consultation  by Velasquez Chawla M.D.    Jose Tiwari MRN# [id number]    YOB: 1945     Requesting physician: Tre Mcintyre     DX:   77M with L forearm UPS      TREATMENTS:  1. 7/25/2022: Left forearm mass excision (Dr. 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 8/8/2022: Left forearm seroma aspiration  3. External beam RT ([facility]             Assessment and Plan:   Assessment:  76yo M with L forearm pleomorphic sarcoma, s/p excision with positive margins (7/25/2022)     Plan:  1. PET-CT scan negative for metastasis  2. Will obtain MRI L forearm with and without contrast today  3. Starting radiation next week  4. Follow-up with gastroenterologist about pancreas updake  5. Will plan for surgical wide re-excision about 1 month after radiation ends.   6. Will possibly need skin graft after re-excision. This may require the help of plastic surgery.  7. Patient educated on the high risk of wound complications with this procedure after radiation   8. F/U after radiation     Ki Mi MD  Adult Reconstructive Surgery Fellow  Department of Orthopaedic Surgery, [facility]    Attending MD (Dr. Velasquez Chawla) Attestation :  This patient was seen and evaluated by me including a history, exam, and interpretation of all imaging and/or lab data.  I formulated the treatment plan along with either a physician's assistant (PABonitaC) or training physician (resident/fellow), who also saw the patient. That individual or a scribe has documented the visit in the attached note which I approve.    Velasquez Chawla MD  [facility] Professor  Oncology  and Adult Reconstructive Surgery  Dept Orthopaedic Surgery, [facility]  [phone] office, [phone] pager  [email or web address]                    History of Present Illness:   77 year old RHD retired male presents to the office for a follow-up left forearm UPS. The patient states that the mass has been around for about 6 months to 1 year, without previous injury. Dr. Henson excised the mass on 7/25/2022. 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      The patient underwent a PET-CT, which showed no definitive metastasis. There is a vertical linear uptake along the head of the pancreas. He will begin radiation starting next week.      The patient denies fevers/chills. Denies numbness/tingling/weakness. Denies weight loss. 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        Current symptoms:  Problem: Left forearm mass  Onset and duration: 6mo -1yr  Awakens from sleep due to sx's:  No  Precipitating Injury:  No    Other joints or sites painful:  No  Fever: No  Appetite change or weight loss: No  History of prior or existing cancer: Yes                Physical Exam:      EXAMINATION pertinent findings:   PSYCH: Pleasant, healthy-appearing, alert, oriented x3, cooperative. Normal mood and affect.  VITAL SIGNS: There were no vitals taken for this visit..  Reviewed nursing intake notes.   There is no height or weight on file to calculate BMI.  RESP: non labored breathing   ABD: benign, soft, non-tender, no acute peritoneal findings  SKIN: grossly normal   LYMPHATIC: grossly normal, no adenopathy, no extremity edema  NEURO: grossly normal , no motor deficits  VASCULAR: satisfactory perfusion of all extremities      MUSCULOSKELETAL:   LUE:  Skin intact. Incision well-healing  There is still a mass in the mid-forearm around the ulna. It's a firmer than last visit  No TTP or erythema  Full elbow and wrist ROM  Fires  AIN/PIN/U  SILT M/R/U  WWP                   Data:   All laboratory data reviewed  All imaging studies reviewed by me  PET scan from 8/15 shows no definite metastasis. There is a vertical linear uptake along the head of the pancreas.     Total combined visit time and work time before and after clinic visit = 20 min            Again, thank you for allowing me to participate in the care of your patient.        Sincerely,        Velasquez Chawla MD

## 2022-08-22 NOTE — LETTER
8/22/2022         RE: Jose Tiwari  [address] [facility] [address]        Dear Colleague,    Thank you for referring your patient, Jose Tiwari, to the [facility] [place]. Please see a copy of my visit note below.        [facility], Orthopaedic Oncology Surgery Consultation  by Velasquez Chawla M.D.    Jose Tiwari MRN# [id number]    YOB: 1945     Requesting physician: Tre Mcintyre     DX:   77M with L forearm UPS      TREATMENTS:  1. 7/25/2022: Left forearm mass excision (Dr. 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 8/8/2022: Left forearm seroma aspiration  3. 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 PET-CT scan negative for metastasis  2. Will obtain MRI L forearm with and without contrast today  3. Starting radiation next week  4. Follow-up with gastroenterologist about pancreas updake  5. Will plan for surgical wide re-excision about 1 month after radiation ends.   6. Will possibly need skin graft after re-excision. This may require the help of plastic surgery.  7. Patient educated on the high risk of wound complications with this procedure after radiation   8. F/U after radiation     Ki Mi MD  Adult Reconstructive Surgery Fellow  Department of Orthopaedic Surgery, [facility]    Attending MD (Dr. Velasquez Chawla) Attestation :  This patient was seen and evaluated by me including a history, exam, and interpretation of all imaging and/or lab data.  I formulated the treatment plan along with either a physician's assistant (PABonitaC) or training physician (resident/fellow), who also saw the patient. That individual or a scribe has documented the visit in the attached note which I approve.    Velasquez Chawla MD  [facility] Professor  Oncology  and Adult Reconstructive Surgery  Dept Orthopaedic Surgery, [facility]  [phone] office, [phone] pager  [email or web address]                    History of Present Illness:   77 year old RHD retired male presents to the office for a follow-up left forearm UPS. The patient states that the mass has been around for about 6 months to 1 year, without previous injury. Dr. Henson excised the mass on 7/25/2022. 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      The patient underwent a PET-CT, which showed no definitive metastasis. There is a vertical linear uptake along the head of the pancreas. He will begin radiation starting next week.      The patient denies fevers/chills. Denies numbness/tingling/weakness. Denies weight loss. 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        Current symptoms:  Problem: Left forearm mass  Onset and duration: 6mo -1yr  Awakens from sleep due to sx's:  No  Precipitating Injury:  No    Other joints or sites painful:  No  Fever: No  Appetite change or weight loss: No  History of prior or existing cancer: Yes                Physical Exam:      EXAMINATION pertinent findings:   PSYCH: Pleasant, healthy-appearing, alert, oriented x3, cooperative. Normal mood and affect.  VITAL SIGNS: There were no vitals taken for this visit..  Reviewed nursing intake notes.   There is no height or weight on file to calculate BMI.  RESP: non labored breathing   ABD: benign, soft, non-tender, no acute peritoneal findings  SKIN: grossly normal   LYMPHATIC: grossly normal, no adenopathy, no extremity edema  NEURO: grossly normal , no motor deficits  VASCULAR: satisfactory perfusion of all extremities      MUSCULOSKELETAL:   LUE:  Skin intact. Incision well-healing  There is still a mass in the mid-forearm around the ulna. It's a firmer than last visit  No TTP or erythema  Full elbow and wrist ROM  Fires  AIN/PIN/U  SILT M/R/U  WWP                   Data:   All laboratory data reviewed  All imaging studies reviewed by me  PET scan from 8/15 shows no definite metastasis. There is a vertical linear uptake along the head of the pancreas.     Total combined visit time and work time before and after clinic visit = 20 min            Again, thank you for allowing me to participate in the care of your patient.        Sincerely,        Velasquez Chawla MD

## 2022-08-22 NOTE — LETTER
Date:August 22, 2022      Patient was self referred, no letter generated. Do not send.        [facility] Health Information

## 2022-08-26 ENCOUNTER — TELEPHONE (OUTPATIENT)
Dept: ORTHOPEDICS | Facility: CLINIC | Age: 77
End: 2022-08-26

## 2022-08-26 NOTE — TELEPHONE ENCOUNTER
FUTURE VISIT INFORMATION      FUTURE VISIT INFORMATION:    Date: 10/19/22    Time: 3:45pm    [place] [facility]  REFERRAL INFORMATION:   Referring provider:  Velasquez Chawla MD    Referring providers clinic:  [facility]    Reason for visit/diagnosis  likely need skin graft after excision of forearm mass // appt per Orthopedics clinic    RECORDS REQUESTED FROM:       Clinic name Comments Records Status Imaging Status   [facility] OV/referral 8/22/22 EPIC

## 2022-08-26 NOTE — TELEPHONE ENCOUNTER
-Spoke to the patient over the phone.    -Informed the patient of two upcoming appointments:    1.  Dr. Chawla (10/6/22 at 9:40 am)  2.  Plastic Surgery Consult (10/19/22 at 3:45 pm)    -Patient voiced an understanding and a willingness to proceed.    Kayla May RN  8/26/2022 10:30 AM

## 2022-08-29 ENCOUNTER — APPOINTMENT (OUTPATIENT)
Dept: RADIATION ONCOLOGY | Facility: CLINIC | Age: 77
End: 2022-08-29
Attending: RADIOLOGY
Payer: MEDICARE

## 2022-08-29 PROCEDURE — 77412 RADIATION TX DELIVERY LVL 3: CPT | Performed by: RADIOLOGY

## 2022-08-29 PROCEDURE — 77336 RADIATION PHYSICS CONSULT: CPT | Mod: XU | Performed by: RADIOLOGY

## 2022-08-29 PROCEDURE — 77280 THER RAD SIMULAJ FIELD SMPL: CPT | Mod: 26 | Performed by: RADIOLOGY

## 2022-08-29 PROCEDURE — 77280 THER RAD SIMULAJ FIELD SMPL: CPT | Performed by: RADIOLOGY

## 2022-08-30 ENCOUNTER — APPOINTMENT (OUTPATIENT)
Dept: RADIATION ONCOLOGY | Facility: CLINIC | Age: 77
End: 2022-08-30
Attending: RADIOLOGY
Payer: MEDICARE

## 2022-08-30 PROCEDURE — 77412 RADIATION TX DELIVERY LVL 3: CPT | Performed by: RADIOLOGY

## 2022-08-31 ENCOUNTER — APPOINTMENT (OUTPATIENT)
Dept: RADIATION ONCOLOGY | Facility: CLINIC | Age: 77
End: 2022-08-31
Attending: RADIOLOGY
Payer: MEDICARE

## 2022-08-31 ENCOUNTER — OFFICE VISIT (OUTPATIENT)
Dept: RADIATION ONCOLOGY | Facility: CLINIC | Age: 77
End: 2022-08-31
Attending: ORTHOPAEDIC SURGERY
Payer: MEDICARE

## 2022-08-31 VITALS
HEART RATE: 53 BPM | OXYGEN SATURATION: 96 % | BODY MASS INDEX: 37.57 KG/M2 | RESPIRATION RATE: 16 BRPM | DIASTOLIC BLOOD PRESSURE: 84 MMHG | WEIGHT: 269.4 LBS | SYSTOLIC BLOOD PRESSURE: 152 MMHG

## 2022-08-31 DIAGNOSIS — R22.32 MASS OF LEFT FOREARM: Primary | ICD-10-CM

## 2022-08-31 DIAGNOSIS — C49.12 SARCOMA OF LEFT UPPER EXTREMITY (H): ICD-10-CM

## 2022-08-31 PROCEDURE — 77427 RADIATION TX MANAGEMENT X5: CPT | Mod: GC | Performed by: RADIOLOGY

## 2022-08-31 PROCEDURE — 77412 RADIATION TX DELIVERY LVL 3: CPT | Performed by: RADIOLOGY

## 2022-08-31 ASSESSMENT — PAIN SCALES - GENERAL: PAINLEVEL: NO PAIN (0)

## 2022-08-31 NOTE — LETTER
2022         RE: Jose Tiwari  [address] [facility] [address]        Dear Colleague,    Thank you for referring your patient, Jose Tiwari, to the [facility] RADIATION ONCOLOGY. Please see a copy of my visit note below.    RADIATION ONCOLOGY WEEKLY ON TREATMENT VISIT         Jose Tiwari      Date: Aug 31, 2022   MRN: [id number]   : 1945    Diagnosis: sarcoma    Reason for Visit:  On Radiation Treatment Visit     Treatment Summary to Date  Treatment Site: left forearm Current Dose: 600/5000 cGy Fractions: 3/25      Chemotherapy  Chemo concurrent with radx?: No    Subjective:   Mr. Tiwari is tolerating the start of radiotherapy well.     Nursing ROS:   Nutrition Alteration  Diet Type: Patient's Preference  Skin  Skin Reaction: 0 - No changes  Skin Intervention: aquaphor  Skin Note: educated on skin care        Cardiovascular  Respiratory effort: 1 - Normal - without distress  Gastrointestinal  Nausea: 0 - None  Genitourinary  Urinary Status: 0 - Normal     Pain Assessment  0-10 Pain Scale: 0    Treatment Breaks: No  ED visits / Hospitalizations: No    Objective:   BP (!) 152/84   Pulse 53   Resp 16   Wt 122.2 kg (269 lb 6.4 oz)   SpO2 96%   BMI 37.57 kg/m    Gen: Appears well, in no acute distress  Skin: No erythema    Labs:  None new    Assessment:    Tolerating radiation therapy well.  All questions and concerns addressed.    Toxicities:  None    Plan:   1. Continue current therapy.    2. Aquaphor application to treatment field PRN.      Mosaiq chart and setup information reviewed  MVCT/IGRT images checked    Medication Review  Medication changes: none    Educational Topic Discussed  Additional Instructions: continue radiaiton therapy  Education Instructions: educated on SE redudcution    Yudelka Mobley MD PGY-4  Radiation Oncology  [facility]    I saw the patient with the resident.  I agree with the resident note and plan of care.      Dominga Mims MD

## 2022-08-31 NOTE — PROGRESS NOTES
RADIATION ONCOLOGY WEEKLY ON TREATMENT VISIT         Jose Tiwari      Date: Aug 31, 2022   MRN: [id number]   : 1945    Diagnosis: sarcoma    Reason for Visit:  On Radiation Treatment Visit     Treatment Summary to Date  Treatment Site: left forearm Current Dose: 600/5000 cGy Fractions: 3/25      Chemotherapy  Chemo concurrent with radx?: No    Subjective:   Mr. Tiwari is tolerating the start of radiotherapy well.     Nursing ROS:   Nutrition Alteration  Diet Type: Patient's Preference  Skin  Skin Reaction: 0 - No changes  Skin Intervention: aquaphor  Skin Note: educated on skin care        Cardiovascular  Respiratory effort: 1 - Normal - without distress  Gastrointestinal  Nausea: 0 - None  Genitourinary  Urinary Status: 0 - Normal     Pain Assessment  0-10 Pain Scale: 0    Treatment Breaks: No  ED visits / Hospitalizations: No    Objective:   BP (!) 152/84   Pulse 53   Resp 16   Wt 122.2 kg (269 lb 6.4 oz)   SpO2 96%   BMI 37.57 kg/m    Gen: Appears well, in no acute distress  Skin: No erythema    Labs:  None new    Assessment:    Tolerating radiation therapy well.  All questions and concerns addressed.    Toxicities:  None    Plan:   1. Continue current therapy.    2. Aquaphor application to treatment field PRN.      Mosaiq chart and setup information reviewed  MVCT/IGRT images checked    Medication Review  Medication changes: none    Educational Topic Discussed  Additional Instructions: continue radiaiton therapy  Education Instructions: educated on SE redudcution    Yudelka Mobley MD PGY-4  Radiation Oncology  [facility]    I saw the patient with the resident.  I agree with the resident note and plan of care.      Dominga Mims MD

## 2022-09-01 ENCOUNTER — APPOINTMENT (OUTPATIENT)
Dept: RADIATION ONCOLOGY | Facility: CLINIC | Age: 77
End: 2022-09-01
Attending: RADIOLOGY
Payer: MEDICARE

## 2022-09-01 PROCEDURE — 77412 RADIATION TX DELIVERY LVL 3: CPT | Performed by: RADIOLOGY

## 2022-09-01 PROCEDURE — 77417 THER RADIOLOGY PORT IMAGE(S): CPT | Performed by: RADIOLOGY

## 2022-09-02 ENCOUNTER — APPOINTMENT (OUTPATIENT)
Dept: RADIATION ONCOLOGY | Facility: CLINIC | Age: 77
End: 2022-09-02
Attending: RADIOLOGY
Payer: MEDICARE

## 2022-09-02 PROCEDURE — 77412 RADIATION TX DELIVERY LVL 3: CPT | Performed by: RADIOLOGY

## 2022-09-06 ENCOUNTER — APPOINTMENT (OUTPATIENT)
Dept: RADIATION ONCOLOGY | Facility: CLINIC | Age: 77
End: 2022-09-06
Attending: RADIOLOGY
Payer: MEDICARE

## 2022-09-06 PROCEDURE — 77412 RADIATION TX DELIVERY LVL 3: CPT | Performed by: RADIOLOGY

## 2022-09-06 PROCEDURE — 77336 RADIATION PHYSICS CONSULT: CPT | Performed by: RADIOLOGY

## 2022-09-06 PROCEDURE — 77417 THER RADIOLOGY PORT IMAGE(S): CPT | Performed by: RADIOLOGY

## 2022-09-07 ENCOUNTER — APPOINTMENT (OUTPATIENT)
Dept: RADIATION ONCOLOGY | Facility: CLINIC | Age: 77
End: 2022-09-07
Attending: RADIOLOGY
Payer: MEDICARE

## 2022-09-07 ENCOUNTER — OFFICE VISIT (OUTPATIENT)
Dept: RADIATION ONCOLOGY | Facility: CLINIC | Age: 77
End: 2022-09-07
Attending: ORTHOPAEDIC SURGERY
Payer: MEDICARE

## 2022-09-07 VITALS
OXYGEN SATURATION: 95 % | DIASTOLIC BLOOD PRESSURE: 86 MMHG | SYSTOLIC BLOOD PRESSURE: 152 MMHG | RESPIRATION RATE: 16 BRPM | HEART RATE: 58 BPM

## 2022-09-07 DIAGNOSIS — C49.12 SARCOMA OF LEFT UPPER EXTREMITY (H): Primary | ICD-10-CM

## 2022-09-07 PROCEDURE — 77412 RADIATION TX DELIVERY LVL 3: CPT | Performed by: RADIOLOGY

## 2022-09-07 ASSESSMENT — PAIN SCALES - GENERAL: PAINLEVEL: NO PAIN (0)

## 2022-09-07 NOTE — LETTER
2022         RE: Jose Tiwari  [address] [facility] [address]        Dear Colleague,    Thank you for referring your patient, Jose Tiwari, to the [facility] RADIATION ONCOLOGY. Please see a copy of my visit note below.    [facility] PHYSICIANS  SPECIALIZING IN BREAKTHROUGHS  Radiation Oncology    On Treatment Visit Note      Jose Tiwari      Date: 2022   MRN: [id number]   : 1945  Diagnosis: sarcoma    Reason for Visit:  On Radiation Treatment Visit     Treatment Summary to Date  Site:  preop RT   Treatment Site: left forearm Current Dose:    Current Dose: 1400/5000 cGy Fractions:    Fractions:       Chemotherapy  Chemo concurrent with radx?: No    ED Visit/Hospital Admission: no    Treatment Breaks: no    Subjective:    Mr. Tiwari is tolerating radiotherapy. [place] tired over the weekend but doing well now.    Nursing ROS:   Nutrition Alteration  Diet Type: Patient's Preference  Skin  Skin Reaction: 0 - No changes  Skin Intervention: aquaphor  Skin Note: educated on skin care      Cardiovascular  Respiratory effort: 1 - Normal - without distress  Gastrointestinal  Nausea: 0 - None  Genitourinary  Urinary Status: 0 - Normal     Pain Assessment  0-10 Pain Scale: 0    Objective:   BP (!) 152/86   Pulse 58   Resp 16   SpO2 95%   Gen: Appears well, in no acute distress  Skin: No erythema    Jose Tiwari is pleasantly conversant, euthymic in mood, breathing comfortably on room air without any audible wheeze and noncyanotic.    Assessment:    Tolerating radiation therapy well.  All questions and concerns addressed.    Toxicities:  Fatigue: Grade 1: Fatigue relieved by rest    Plan:   1. Continue current therapy.      Mosaiq chart and setup information reviewed  Ports checked    Medication Review  Medication changes: none    Educational Topic Discussed  Additional Instructions: continue radiaiton therapy  Education Instructions: educated on SE redudcution    Patient  was seen and discussed with my attending physician, Dr. Mims.    Jeremy Corrales MD, MS PGY-2  Radiation Oncology    I saw the patient with the resident.  I agree with the resident note and plan of care.      Dominga Mims MD

## 2022-09-07 NOTE — PROGRESS NOTES
[facility] PHYSICIANS  SPECIALIZING IN BREAKTHROUGHS  Radiation Oncology    On Treatment Visit Note      Jose Tiwari      Date: 2022   MRN: [id number]   : 1945  Diagnosis: sarcoma    Reason for Visit:  On Radiation Treatment Visit     Treatment Summary to Date  Site:  preop RT   Treatment Site: left forearm Current Dose:    Current Dose: 1400/5000 cGy Fractions:    Fractions:       Chemotherapy  Chemo concurrent with radx?: No    ED Visit/Hospital Admission: no    Treatment Breaks: no    Subjective:    Mr. Tiwari is tolerating radiotherapy. [place] tired over the weekend but doing well now.    Nursing ROS:   Nutrition Alteration  Diet Type: Patient's Preference  Skin  Skin Reaction: 0 - No changes  Skin Intervention: aquaphor  Skin Note: educated on skin care      Cardiovascular  Respiratory effort: 1 - Normal - without distress  Gastrointestinal  Nausea: 0 - None  Genitourinary  Urinary Status: 0 - Normal     Pain Assessment  0-10 Pain Scale: 0    Objective:   BP (!) 152/86   Pulse 58   Resp 16   SpO2 95%   Gen: Appears well, in no acute distress  Skin: No erythema    Jose Tiwari is pleasantly conversant, euthymic in mood, breathing comfortably on room air without any audible wheeze and noncyanotic.    Assessment:    Tolerating radiation therapy well.  All questions and concerns addressed.    Toxicities:  Fatigue: Grade 1: Fatigue relieved by rest    Plan:   1. Continue current therapy.      Mosaiq chart and setup information reviewed  Ports checked    Medication Review  Medication changes: none    Educational Topic Discussed  Additional Instructions: continue radiaiton therapy  Education Instructions: educated on SE redudcution    Patient was seen and discussed with my attending physician, Dr. Mims.    Jeremy Corrales MD, MS PGY-2  Radiation Oncology    I saw the patient with the resident.  I agree with the resident note and plan of care.      Dominga Mims MD

## 2022-09-08 ENCOUNTER — APPOINTMENT (OUTPATIENT)
Dept: RADIATION ONCOLOGY | Facility: CLINIC | Age: 77
End: 2022-09-08
Attending: RADIOLOGY
Payer: MEDICARE

## 2022-09-08 PROCEDURE — 77412 RADIATION TX DELIVERY LVL 3: CPT | Performed by: RADIOLOGY

## 2022-09-09 ENCOUNTER — APPOINTMENT (OUTPATIENT)
Dept: RADIATION ONCOLOGY | Facility: CLINIC | Age: 77
End: 2022-09-09
Attending: RADIOLOGY
Payer: MEDICARE

## 2022-09-09 PROCEDURE — 77412 RADIATION TX DELIVERY LVL 3: CPT | Performed by: RADIOLOGY

## 2022-09-12 ENCOUNTER — APPOINTMENT (OUTPATIENT)
Dept: RADIATION ONCOLOGY | Facility: CLINIC | Age: 77
End: 2022-09-12
Attending: RADIOLOGY
Payer: MEDICARE

## 2022-09-12 PROCEDURE — 77412 RADIATION TX DELIVERY LVL 3: CPT | Performed by: RADIOLOGY

## 2022-09-12 PROCEDURE — 77427 RADIATION TX MANAGEMENT X5: CPT | Mod: GC | Performed by: RADIOLOGY

## 2022-09-13 ENCOUNTER — APPOINTMENT (OUTPATIENT)
Dept: RADIATION ONCOLOGY | Facility: CLINIC | Age: 77
End: 2022-09-13
Attending: RADIOLOGY
Payer: MEDICARE

## 2022-09-13 PROCEDURE — 77336 RADIATION PHYSICS CONSULT: CPT | Performed by: RADIOLOGY

## 2022-09-13 PROCEDURE — 77412 RADIATION TX DELIVERY LVL 3: CPT | Performed by: RADIOLOGY

## 2022-09-13 PROCEDURE — 77417 THER RADIOLOGY PORT IMAGE(S): CPT | Performed by: RADIOLOGY

## 2022-09-14 ENCOUNTER — APPOINTMENT (OUTPATIENT)
Dept: RADIATION ONCOLOGY | Facility: CLINIC | Age: 77
End: 2022-09-14
Attending: RADIOLOGY
Payer: MEDICARE

## 2022-09-14 ENCOUNTER — OFFICE VISIT (OUTPATIENT)
Dept: RADIATION ONCOLOGY | Facility: CLINIC | Age: 77
End: 2022-09-14
Attending: ORTHOPAEDIC SURGERY
Payer: MEDICARE

## 2022-09-14 VITALS — OXYGEN SATURATION: 96 % | DIASTOLIC BLOOD PRESSURE: 82 MMHG | HEART RATE: 56 BPM | SYSTOLIC BLOOD PRESSURE: 147 MMHG

## 2022-09-14 DIAGNOSIS — C49.12 SARCOMA OF LEFT UPPER EXTREMITY (H): Primary | ICD-10-CM

## 2022-09-14 PROCEDURE — 77412 RADIATION TX DELIVERY LVL 3: CPT | Performed by: RADIOLOGY

## 2022-09-14 ASSESSMENT — PAIN SCALES - GENERAL: PAINLEVEL: NO PAIN (0)

## 2022-09-14 NOTE — LETTER
2022         RE: Jose Tiwari  [address] [facility] [address]        Dear Colleague,    Thank you for referring your patient, Jose Tiwari, to the [facility] RADIATION ONCOLOGY. Please see a copy of my visit note below.    [facility] PHYSICIANS  SPECIALIZING IN BREAKTHROUGHS  Radiation Oncology    On Treatment Visit Note      Jose Tiwari      Date: 2022   MRN: [id number]   : 1945  Diagnosis: Sarcoma Left Upper Extremity    Reason for Visit:  On Radiation Treatment Visit     Treatment Summary to Date  Site:    Treatment Site: Left arm Current Dose:    Current Dose: 2400/500 cGy Fractions:    Fractions:       Chemotherapy  Chemo concurrent with radx?: No    ED Visit/Hospital Admission: no    Treatment Breaks: none    Subjective:   Mr Tiwari is doing well.  He has no complaints.    Nursing ROS:   Nutrition Alteration  Diet Type: Patient's Preference  Skin  Skin Reaction: 0 - No changes  Skin Intervention: aquaphor  Skin Note: educated on skin care    Cardiovascular  Respiratory effort: 1 - Normal - without distress  Gastrointestinal  Nausea: 0 - None  Vomitin - No vomiting (ons)  Genitourinary  Urinary Status: 0 - Normal  Psychosocial  Mood - Anxiety: 0 - Normal  Pain Assessment  0-10 Pain Scale: 0    Objective:   BP (!) 147/82 (BP Location: Right arm, Patient Position: Sitting, Cuff Size: Adult Regular)   Pulse 56   SpO2 96%     Jose Tiwari  is pleasantly conversant, euthymic in mood, breathing comfortably on room air without any audible wheeze and noncyanotic.      Assessment:    Tolerating radiation therapy well.  All questions and concerns addressed.    Toxicities: none    Plan:   1. Continue current therapy.        Mosaiq chart and setup information reviewed  Ports checked    Medication Review  Medication changes: none    Educational Topic Discussed  Additional Instructions: continue radiaiton therapy  Education Instructions: Radiation side  effect/skin care    Patient was seen and discussed with my attending physician, Dr. Mims.    Jeremy Corrales MD, MS PGY-2  Radiation Oncology      I saw the patient with the resident.  I agree with the resident note and plan of care.      Dominga Mims MD

## 2022-09-14 NOTE — PROGRESS NOTES
[facility] PHYSICIANS  SPECIALIZING IN BREAKTHROUGHS  Radiation Oncology    On Treatment Visit Note      Jose Tiwari      Date: 2022   MRN: [id number]   : 1945  Diagnosis: Sarcoma Left Upper Extremity    Reason for Visit:  On Radiation Treatment Visit     Treatment Summary to Date  Site:    Treatment Site: Left arm Current Dose:    Current Dose: 2400/500 cGy Fractions:    Fractions:       Chemotherapy  Chemo concurrent with radx?: No    ED Visit/Hospital Admission: no    Treatment Breaks: none    Subjective:   Mr Tiwari is doing well.  He has no complaints.    Nursing ROS:   Nutrition Alteration  Diet Type: Patient's Preference  Skin  Skin Reaction: 0 - No changes  Skin Intervention: aquaphor  Skin Note: educated on skin care    Cardiovascular  Respiratory effort: 1 - Normal - without distress  Gastrointestinal  Nausea: 0 - None  Vomitin - No vomiting (ons)  Genitourinary  Urinary Status: 0 - Normal  Psychosocial  Mood - Anxiety: 0 - Normal  Pain Assessment  0-10 Pain Scale: 0    Objective:   BP (!) 147/82 (BP Location: Right arm, Patient Position: Sitting, Cuff Size: Adult Regular)   Pulse 56   SpO2 96%     Jose Tiwari  is pleasantly conversant, euthymic in mood, breathing comfortably on room air without any audible wheeze and noncyanotic.      Assessment:    Tolerating radiation therapy well.  All questions and concerns addressed.    Toxicities: none    Plan:   1. Continue current therapy.        Mosaiq chart and setup information reviewed  Ports checked    Medication Review  Medication changes: none    Educational Topic Discussed  Additional Instructions: continue radiaiton therapy  Education Instructions: Radiation side effect/skin care    Patient was seen and discussed with my attending physician, Dr. Mims.    Jeremy Corrales MD, MS PGY-2  Radiation Oncology      I saw the patient with the resident.  I agree with the resident note and plan of care.      Dominga EARLY  MD Felicita

## 2022-09-15 ENCOUNTER — APPOINTMENT (OUTPATIENT)
Dept: RADIATION ONCOLOGY | Facility: CLINIC | Age: 77
End: 2022-09-15
Attending: RADIOLOGY
Payer: MEDICARE

## 2022-09-15 PROCEDURE — 77412 RADIATION TX DELIVERY LVL 3: CPT | Performed by: RADIOLOGY

## 2022-09-19 ENCOUNTER — APPOINTMENT (OUTPATIENT)
Dept: RADIATION ONCOLOGY | Facility: CLINIC | Age: 77
End: 2022-09-19
Attending: RADIOLOGY
Payer: MEDICARE

## 2022-09-19 PROCEDURE — 77412 RADIATION TX DELIVERY LVL 3: CPT | Performed by: RADIOLOGY

## 2022-09-20 ENCOUNTER — APPOINTMENT (OUTPATIENT)
Dept: RADIATION ONCOLOGY | Facility: CLINIC | Age: 77
End: 2022-09-20
Attending: RADIOLOGY
Payer: MEDICARE

## 2022-09-20 PROCEDURE — 77417 THER RADIOLOGY PORT IMAGE(S): CPT | Performed by: RADIOLOGY

## 2022-09-20 PROCEDURE — 77427 RADIATION TX MANAGEMENT X5: CPT | Mod: GC | Performed by: RADIOLOGY

## 2022-09-20 PROCEDURE — 77412 RADIATION TX DELIVERY LVL 3: CPT | Performed by: RADIOLOGY

## 2022-09-21 ENCOUNTER — APPOINTMENT (OUTPATIENT)
Dept: RADIATION ONCOLOGY | Facility: CLINIC | Age: 77
End: 2022-09-21
Attending: RADIOLOGY
Payer: MEDICARE

## 2022-09-21 ENCOUNTER — OFFICE VISIT (OUTPATIENT)
Dept: RADIATION ONCOLOGY | Facility: CLINIC | Age: 77
End: 2022-09-21
Attending: ORTHOPAEDIC SURGERY
Payer: MEDICARE

## 2022-09-21 VITALS
DIASTOLIC BLOOD PRESSURE: 84 MMHG | OXYGEN SATURATION: 93 % | WEIGHT: 273.8 LBS | RESPIRATION RATE: 16 BRPM | HEART RATE: 54 BPM | BODY MASS INDEX: 38.19 KG/M2 | SYSTOLIC BLOOD PRESSURE: 146 MMHG

## 2022-09-21 DIAGNOSIS — C49.12 SARCOMA OF LEFT UPPER EXTREMITY (H): Primary | ICD-10-CM

## 2022-09-21 PROCEDURE — 77412 RADIATION TX DELIVERY LVL 3: CPT | Performed by: RADIOLOGY

## 2022-09-21 PROCEDURE — 77336 RADIATION PHYSICS CONSULT: CPT | Performed by: RADIOLOGY

## 2022-09-21 ASSESSMENT — PAIN SCALES - GENERAL: PAINLEVEL: NO PAIN (0)

## 2022-09-21 NOTE — PROGRESS NOTES
[facility] PHYSICIANS  SPECIALIZING IN BREAKTHROUGHS  Radiation Oncology    On Treatment Visit Note      Jose Tiwari      Date: 2022   MRN: [id number]   : 1945  Diagnosis: Sarcoma Left Upper Extremity    Reason for Visit:  On Radiation Treatment Visit     Treatment Summary to Date  Site:    Treatment Site: Left arm Current Dose:    Current Dose: 3400/500 cGy Fractions:    Fractions:       Chemotherapy  Chemo concurrent with radx?: No    ED Visit/Hospital Admission: no    Treatment Breaks: none    Subjective:   Mr Tiwari is doing well.  He has no complaints.    Nursing ROS:   Nutrition Alteration  Diet Type: Patient's Preference  Skin  Skin Reaction: 0 - No changes  Skin Intervention: aquaphor  Skin Note: educated on skin care    Cardiovascular  Respiratory effort: 1 - Normal - without distress  Gastrointestinal  Nausea: 0 - None  Vomitin - No vomiting (ons)  Genitourinary  Urinary Status: 0 - Normal  Psychosocial  Mood - Anxiety: 0 - Normal  Pain Assessment  0-10 Pain Scale: 0    Objective:   BP (!) 146/84   Pulse 54   Resp 16   Wt 124.2 kg (273 lb 12.8 oz)   SpO2 93%   BMI 38.19 kg/m     No skin reaction      Assessment:    Tolerating radiation therapy well.  All questions and concerns addressed.  Having gall stone surgery on 10/19    ANticipate sarcoma surgery in early November     Toxicities: none    Plan:   1. Continue current therapy.        Mosaiq chart and setup information reviewed  Ports checked    Medication Review  Medication changes: none    Educational Topic Discussed  Additional Instructions: continue radiaiton therapy  Education Instructions: Radiation side effect/skin care       Dominga Mims MD

## 2022-09-21 NOTE — LETTER
2022         RE: Jose Tiwari  [address] [facility] [address]        Dear Colleague,    Thank you for referring your patient, Jose Tiwari, to the [facility] RADIATION ONCOLOGY. Please see a copy of my visit note below.    [facility] PHYSICIANS  SPECIALIZING IN BREAKTHROUGHS  Radiation Oncology    On Treatment Visit Note      Jose Tiwari      Date: 2022   MRN: [id number]   : 1945  Diagnosis: Sarcoma Left Upper Extremity    Reason for Visit:  On Radiation Treatment Visit     Treatment Summary to Date  Site:    Treatment Site: Left arm Current Dose:    Current Dose: 3400/500 cGy Fractions:    Fractions:       Chemotherapy  Chemo concurrent with radx?: No    ED Visit/Hospital Admission: no    Treatment Breaks: none    Subjective:   Mr Tiwari is doing well.  He has no complaints.    Nursing ROS:   Nutrition Alteration  Diet Type: Patient's Preference  Skin  Skin Reaction: 0 - No changes  Skin Intervention: aquaphor  Skin Note: educated on skin care    Cardiovascular  Respiratory effort: 1 - Normal - without distress  Gastrointestinal  Nausea: 0 - None  Vomitin - No vomiting (ons)  Genitourinary  Urinary Status: 0 - Normal  Psychosocial  Mood - Anxiety: 0 - Normal  Pain Assessment  0-10 Pain Scale: 0    Objective:   BP (!) 146/84   Pulse 54   Resp 16   Wt 124.2 kg (273 lb 12.8 oz)   SpO2 93%   BMI 38.19 kg/m     No skin reaction      Assessment:    Tolerating radiation therapy well.  All questions and concerns addressed.  Having gall stone surgery on 10/19    ANticipate sarcoma surgery in early November     Toxicities: none    Plan:   1. Continue current therapy.        Mosaiq chart and setup information reviewed  Ports checked    Medication Review  Medication changes: none    Educational Topic Discussed  Additional Instructions: continue radiaiton therapy  Education Instructions: Radiation side effect/skin care       Dominga Mims MD

## 2022-09-22 ENCOUNTER — APPOINTMENT (OUTPATIENT)
Dept: RADIATION ONCOLOGY | Facility: CLINIC | Age: 77
End: 2022-09-22
Attending: RADIOLOGY
Payer: MEDICARE

## 2022-09-22 ENCOUNTER — PATIENT OUTREACH (OUTPATIENT)
Dept: PLASTIC SURGERY | Facility: CLINIC | Age: 77
End: 2022-09-22

## 2022-09-22 PROCEDURE — 77412 RADIATION TX DELIVERY LVL 3: CPT | Performed by: RADIOLOGY

## 2022-09-22 NOTE — PATIENT INSTRUCTIONS
Spoke to pt today per Dr Gómez request to change consult appt date. Pt is agreeable to coming in on 10/12 at 1:00 pm and is grateful for the call. All teams updated with the plan.  Cordell MAGUIRE RN

## 2022-09-23 ENCOUNTER — APPOINTMENT (OUTPATIENT)
Dept: RADIATION ONCOLOGY | Facility: CLINIC | Age: 77
End: 2022-09-23
Attending: RADIOLOGY
Payer: MEDICARE

## 2022-09-23 PROCEDURE — 77412 RADIATION TX DELIVERY LVL 3: CPT | Performed by: RADIOLOGY

## 2022-09-26 ENCOUNTER — APPOINTMENT (OUTPATIENT)
Dept: RADIATION ONCOLOGY | Facility: CLINIC | Age: 77
End: 2022-09-26
Attending: RADIOLOGY
Payer: MEDICARE

## 2022-09-26 PROCEDURE — 77412 RADIATION TX DELIVERY LVL 3: CPT | Performed by: RADIOLOGY

## 2022-09-27 ENCOUNTER — APPOINTMENT (OUTPATIENT)
Dept: RADIATION ONCOLOGY | Facility: CLINIC | Age: 77
End: 2022-09-27
Attending: RADIOLOGY
Payer: MEDICARE

## 2022-09-27 PROCEDURE — 77417 THER RADIOLOGY PORT IMAGE(S): CPT | Performed by: RADIOLOGY

## 2022-09-27 PROCEDURE — 77412 RADIATION TX DELIVERY LVL 3: CPT | Performed by: RADIOLOGY

## 2022-09-27 PROCEDURE — 77427 RADIATION TX MANAGEMENT X5: CPT | Performed by: RADIOLOGY

## 2022-09-27 NOTE — PROGRESS NOTES
[facility] PHYSICIANS  SPECIALIZING IN BREAKTHROUGHS  Radiation Oncology    On Treatment Visit Note      Jose Tiwari      Date: 2022   MRN: [id number]   : 1945  Diagnosis: Sarcoma Left Upper Extremity    Reason for Visit:  On Radiation Treatment Visit     Treatment Summary to Date  Site:    Treatment Site: Left arm Current Dose:    Current Dose: 4200/5000 cGy Fractions:    Fractions:       Chemotherapy  Chemo concurrent with radx?: No    ED Visit/Hospital Admission: no  Treatment Breaks: none  Subjective:   Mr Tiwari is doing well.  He has no complaints.  Nursing ROS:   Nutrition Alteration  Diet Type: Patient's Preference  Skin  Skin Reaction: 0 - No changes  Skin Intervention: aquaphor  Skin Note: educated on skin care    Cardiovascular  Respiratory effort: 1 - Normal - without distress  Gastrointestinal  Nausea: 0 - None  Vomitin - No vomiting (ons)  Genitourinary  Urinary Status: 0 - Normal  Psychosocial  Mood - Anxiety: 0 - Normal  Pain Assessment  0-10 Pain Scale: 0    Objective:   There were no vitals taken for this visit.   No skin reaction      Assessment:    Tolerating radiation therapy well.  All questions and concerns addressed.  Having gall stone surgery on 10/19    Plastic surgery consult move to 10/12/  Anticipate sarcoma surgery in early November     Toxicities: none    Plan:   1. Continue current therapy.      Mosaiq chart and setup information reviewed  Ports checked    Medication Review  Medication changes: none    Educational Topic Discussed  Additional Instructions: continue radiaiton therapy  Education Instructions: Radiation side effect/skin care       Dominga Mims MD     Scheduled .

## 2022-09-28 ENCOUNTER — APPOINTMENT (OUTPATIENT)
Dept: RADIATION ONCOLOGY | Facility: CLINIC | Age: 77
End: 2022-09-28
Attending: RADIOLOGY
Payer: MEDICARE

## 2022-09-28 ENCOUNTER — OFFICE VISIT (OUTPATIENT)
Dept: RADIATION ONCOLOGY | Facility: CLINIC | Age: 77
End: 2022-09-28
Attending: ORTHOPAEDIC SURGERY
Payer: MEDICARE

## 2022-09-28 VITALS
SYSTOLIC BLOOD PRESSURE: 157 MMHG | DIASTOLIC BLOOD PRESSURE: 67 MMHG | OXYGEN SATURATION: 97 % | HEART RATE: 60 BPM | WEIGHT: 273 LBS | BODY MASS INDEX: 38.08 KG/M2 | RESPIRATION RATE: 16 BRPM

## 2022-09-28 DIAGNOSIS — C49.12 SARCOMA OF LEFT UPPER EXTREMITY (H): Primary | ICD-10-CM

## 2022-09-28 PROCEDURE — 77412 RADIATION TX DELIVERY LVL 3: CPT | Performed by: RADIOLOGY

## 2022-09-28 PROCEDURE — 77336 RADIATION PHYSICS CONSULT: CPT | Performed by: RADIOLOGY

## 2022-09-28 ASSESSMENT — PAIN SCALES - GENERAL: PAINLEVEL: NO PAIN (0)

## 2022-09-28 NOTE — LETTER
2022         RE: Jose Tiwari  [address] [place] [id number]        Dear Colleague,    Thank you for referring your patient, Jose Tiwari, to the [facility] RADIATION ONCOLOGY. Please see a copy of my visit note below.    [facility] PHYSICIANS  SPECIALIZING IN BREAKTHROUGHS  Radiation Oncology    On Treatment Visit Note      Jose Tiwari      Date: 2022   MRN: [id number]   : 1945  Diagnosis: Sarcoma Left Upper Extremity    Reason for Visit:  On Radiation Treatment Visit     Treatment Summary to Date  Site:    Treatment Site: Left arm Current Dose:    Current Dose: 4200/5000 cGy Fractions:    Fractions:       Chemotherapy  Chemo concurrent with radx?: No    ED Visit/Hospital Admission: no  Treatment Breaks: none  Subjective:   Mr Tiwari is doing well.  He has no complaints.  Nursing ROS:   Nutrition Alteration  Diet Type: Patient's Preference  Skin  Skin Reaction: 0 - No changes  Skin Intervention: aquaphor  Skin Note: educated on skin care    Cardiovascular  Respiratory effort: 1 - Normal - without distress  Gastrointestinal  Nausea: 0 - None  Vomitin - No vomiting (ons)  Genitourinary  Urinary Status: 0 - Normal  Psychosocial  Mood - Anxiety: 0 - Normal  Pain Assessment  0-10 Pain Scale: 0    Objective:   There were no vitals taken for this visit.   No skin reaction      Assessment:    Tolerating radiation therapy well.  All questions and concerns addressed.  Having gall stone surgery on 10/19    Plastic surgery consult move to 10/12/  Anticipate sarcoma surgery in early November     Toxicities: none    Plan:   1. Continue current therapy.      Mosaiq chart and setup information reviewed  Ports checked    Medication Review  Medication changes: none    Educational Topic Discussed  Additional Instructions: continue radiaiton therapy  Education Instructions: Radiation side effect/skin care       Dominga Mims MD

## 2022-09-29 ENCOUNTER — APPOINTMENT (OUTPATIENT)
Dept: RADIATION ONCOLOGY | Facility: CLINIC | Age: 77
End: 2022-09-29
Attending: RADIOLOGY
Payer: MEDICARE

## 2022-09-29 PROCEDURE — 77412 RADIATION TX DELIVERY LVL 3: CPT | Performed by: RADIOLOGY

## 2022-09-30 ENCOUNTER — APPOINTMENT (OUTPATIENT)
Dept: RADIATION ONCOLOGY | Facility: CLINIC | Age: 77
End: 2022-09-30
Attending: RADIOLOGY
Payer: MEDICARE

## 2022-09-30 PROCEDURE — 77412 RADIATION TX DELIVERY LVL 3: CPT | Performed by: RADIOLOGY

## 2022-10-02 ENCOUNTER — HEALTH MAINTENANCE LETTER (OUTPATIENT)
Age: 77
End: 2022-10-02

## 2022-10-03 ENCOUNTER — APPOINTMENT (OUTPATIENT)
Dept: RADIATION ONCOLOGY | Facility: CLINIC | Age: 77
End: 2022-10-03
Attending: RADIOLOGY
Payer: MEDICARE

## 2022-10-03 PROCEDURE — 77412 RADIATION TX DELIVERY LVL 3: CPT | Performed by: RADIOLOGY

## 2022-10-04 ENCOUNTER — APPOINTMENT (OUTPATIENT)
Dept: RADIATION ONCOLOGY | Facility: CLINIC | Age: 77
End: 2022-10-04
Attending: RADIOLOGY
Payer: MEDICARE

## 2022-10-04 ENCOUNTER — TELEPHONE (OUTPATIENT)
Dept: ORTHOPEDICS | Facility: CLINIC | Age: 77
End: 2022-10-04

## 2022-10-04 DIAGNOSIS — C96.4: Primary | ICD-10-CM

## 2022-10-04 PROCEDURE — 77427 RADIATION TX MANAGEMENT X5: CPT | Performed by: RADIOLOGY

## 2022-10-04 PROCEDURE — 77412 RADIATION TX DELIVERY LVL 3: CPT | Performed by: RADIOLOGY

## 2022-10-04 NOTE — TELEPHONE ENCOUNTER
"-Spoke to the patient over the phone.    -Patient reports that his radiation treatment ended today!    -Dr. Chawla would like the patient to obtain a post radiation MRI with a same day clinic visit in 2-3 weeks.    -Both the MRI and clinic visit with Dr. Chawla have been scheduled for Monday, 10/17/22.  (MRI @ 9:00, Clinic visit @ 11:00).    -The patient voiced an understanding and a willingness to proceed.    -The patient also notes that he is scheduled to have \"stones removed from the common bile duct on 10/19 at [facility].     -This information will be forwarded to Dr. Chawla as an FYI.    Kayla May RN  10/4/2022 11:24 AM       "

## 2022-10-05 ENCOUNTER — PREP FOR PROCEDURE (OUTPATIENT)
Dept: ORTHOPEDICS | Facility: CLINIC | Age: 77
End: 2022-10-05

## 2022-10-05 DIAGNOSIS — C49.12 SARCOMA OF UPPER EXTREMITY, LEFT (H): Primary | ICD-10-CM

## 2022-10-12 ENCOUNTER — OFFICE VISIT (OUTPATIENT)
Dept: PLASTIC SURGERY | Facility: CLINIC | Age: 77
End: 2022-10-12
Attending: ORTHOPAEDIC SURGERY
Payer: MEDICARE

## 2022-10-12 ENCOUNTER — ONCOLOGY VISIT (OUTPATIENT)
Dept: RADIATION ONCOLOGY | Facility: CLINIC | Age: 77
End: 2022-10-12

## 2022-10-12 VITALS
BODY MASS INDEX: 37.27 KG/M2 | DIASTOLIC BLOOD PRESSURE: 83 MMHG | HEIGHT: 72 IN | HEART RATE: 64 BPM | SYSTOLIC BLOOD PRESSURE: 172 MMHG | WEIGHT: 275.2 LBS | OXYGEN SATURATION: 97 %

## 2022-10-12 DIAGNOSIS — R22.32 MASS OF LEFT FOREARM: ICD-10-CM

## 2022-10-12 DIAGNOSIS — C96.4: ICD-10-CM

## 2022-10-12 PROCEDURE — 99204 OFFICE O/P NEW MOD 45 MIN: CPT | Performed by: STUDENT IN AN ORGANIZED HEALTH CARE EDUCATION/TRAINING PROGRAM

## 2022-10-12 RX ORDER — ASPIRIN 81 MG/1
81 TABLET, CHEWABLE ORAL DAILY
COMMUNITY

## 2022-10-12 ASSESSMENT — PAIN SCALES - GENERAL: PAINLEVEL: NO PAIN (0)

## 2022-10-12 NOTE — NURSING NOTE
"Chief Complaint   Patient presents with     Consult     Surgical consult -- forearm mass       Vitals:    10/12/22 1301   BP: (!) 172/83   Pulse: 64   SpO2: 97%   Weight: 124.8 kg (275 lb 3.2 oz)   Height: 1.816 m (5' 11.5\")       Body mass index is 37.85 kg/m .          ANGELY MOCTEZUMA EMT    "

## 2022-10-12 NOTE — LETTER
10/12/2022         RE: Jose Tiwari  [address] [place] [address]        Dear Colleague,    Thank you for referring your patient, Jose Tiwari, to the [facility] RADIATION ONCOLOGY. Please see a copy of my visit note below.    No notes on file    Again, thank you for allowing me to participate in the care of your patient.        Sincerely,        Dominga Mims MD

## 2022-10-12 NOTE — LETTER
10/12/2022       RE: Jose Tiwari  [address] [place] [address]     Dear Colleague,    Thank you for referring your patient, Jose Tiwari, to the [facility] PLASTIC AND RECONSTRUCTIVE SURGERY CLINIC [place] at [facility]. Please see a copy of my visit note below.    Plastic and Reconstructive Surgery Note  10/12/2022    HPI:  Jose is a pleasant 77 year old male who presents for evaluation of left forearm related to left forearm pleomorphic spindle cell sarcoma. He was diagnosed in July 2022. He states he had a mass for many months, if not years prior. He is now following with Dr. Chawla. He had surgery with Dr. Henson from [facility] on July 25 and following pathology results was referred to Dr. Chawla. Patient has just completed radiation therapy. His last session was on Oct 4, 2022. He is now awaiting a second surgery with Dr. Chawla which patient believes will be around 4-6 weeks from the completion of his radiation therapy. He is here to discuss closure/reconstruction options.   He has radiation changes to left forearm with slight warm, skin color changes and dermatitis. He has no open lesions. He is not on blood thinners. He does have a penicillin allergy. He is not currently a smoker. He is not diabetic.     Medications:  Current Outpatient Medications   Medication     LANsoprazole (PREVACID) 30 MG DR capsule     [facility] (OSTEO BI-FLEX ADV TRIPLE ST PO)     multivitamin w/minerals (THERA-VIT-M) tablet     No current facility-administered medications for this visit.     Allergy:      Allergies   Allergen Reactions     Atorvastatin      Other reaction(s): Myalgias     No Clinical Screening - See Comments Unknown     Antacids     Omeprazole Other (See Comments)     Patient tried and failed this medication, did not relieve heartburn/GERD symptoms.     Pantoprazole Other (See Comments)     Caused stomach ache     Ciprofloxacin Rash      "Penicillins Rash     Sulfa Drugs Rash     Medical History:  Past Medical History:   Diagnosis Date     Elevated liver enzymes      Esophageal hiatal hernia      Inguinal hernia     Left     SIMONA (obstructive sleep apnea)     wears cpap at night     Pleomorphic cell sarcoma (H) 07/15/2022    L forearm     SCC (squamous cell carcinoma), face     tongue     Surgical History:  Past Surgical History:   Procedure Laterality Date     AS REMOVAL GALLBLADDER       KNEE SURGERY  2010    both knees past 15 yrs     MASS EXCISION  2022    excised mass in L forearm     Social History:  Social History     Tobacco Use     Smoking status: Former     Packs/day: 1.00     Years: 20.00     Pack years: 20.00     Types: Cigarettes, Cigars     Start date: 1961     Quit date: 10/1/1984     Years since quittin.0     Smokeless tobacco: Never     Tobacco comments:     na   Substance Use Topics     Alcohol use: Not Currently     Drug use: Never     ROS:  +left forearm with radiation changes  +left forearm previous surgical incision   +pleomoprhic spindle cell sarcoma    Physical Exam:  BP (!) 172/83   Pulse 64   Ht 1.816 m (5' 11.5\")   Wt 124.8 kg (275 lb 3.2 oz)   SpO2 97%   BMI 37.85 kg/m    Gen: well appearing, NAD. Breathing nonlabored  MSK: left forearm with skin redness and darkening consistent with radiation changes   Incision well healed to left forearm   Radiation dermatitis present to left forearm from elbow to wrist, mostly localized to medial and slightly volar aspect   No palpable masses    Imagin22  MR Left forearm w/wo contrast  IMPRESSION: In this patient with interim histology proven high-grade  spindle cell sarcoma excision, approximately 5 x 24 x 37 mm  postoperative fluid collection collection along the investing layer of  the fascia overlying the extensor carpi ulnaris muscle and extensor  digiti minimi muscle.   a. Wider area of edema and nonmasslike enhancement including along  the investing " layer of deep fascia (10.7 cm in proximodistal  dimension). Given the positive surgical margin, residual tumor on a  background of post operative fluid collection and postoperative  inflammation cannot be excluded.    Assessment:   77 year old male with left forearm pleomoprhic spindle cell sarcoma     Plan:   Discussed surgical reconstruction options at length including: primary closure, delayed complex closure, adjacent tissue advancement, wound vac, skin grafting   Coordination with Dr. Chawla for scheduling of surgery       Attestation signed by William Gómez MD at 10/12/2022  1:46 PM:  Attending Attestation:    77M p/w L forearm sarcoma s/p excision with +margins, now 1 week s/p localized XRT. He has plans for wide-local excision with Dr. Chawla 4-6 weeks after completion of radiation therapy (last treatment: 10/4). On exam, there is a well-healed L extensor forearm incision with no wounds and radiation changes, including erythema, hyperpigmentation and dermatitis. Discussed options for surgical reconstruction, including local wound care only, negative pressure wound therapy, immediate or delayed primary complex closure, split thickness skin grafting, adjacent tissue transfer, muscle flap. The likelihood of need for muscle flap is low given low likelihood of exposed vital structure that cannot be covered with simpler reconstructive plan. Additional incisions as with adjacent tissue transfer will be avoided and only done as necessary given higher risk of wound healing problems post-radiation. Discussed all of these options at-length. Since this is a re-excision of a sarcoma, it would make sense to stage the reconstruction following negative pathology margins. We will communicate with Dr Chawla that he can perform excision and either place wound VAC or wet-to-dry dressing with follow-up in my clinic in 1 week for review of pathology and scheduling of reconstruction. Patient is agreeable with the plan.  Photography today.       William Gómez MD, PhD  Staff Plastic Surgeon

## 2022-10-12 NOTE — PROGRESS NOTES
Plastic and Reconstructive Surgery Note  10/12/2022    HPI:  Jose is a pleasant 77 year old male who presents for evaluation of left forearm related to left forearm pleomorphic spindle cell sarcoma. He was diagnosed in July 2022. He states he had a mass for many months, if not years prior. He is now following with Dr. Chawla. He had surgery with Dr. Henson from [facility] on July 25 and following pathology results was referred to Dr. Chawla. Patient has just completed radiation therapy. His last session was on Oct 4, 2022. He is now awaiting a second surgery with Dr. Chawla which patient believes will be around 4-6 weeks from the completion of his radiation therapy. He is here to discuss closure/reconstruction options.   He has radiation changes to left forearm with slight warm, skin color changes and dermatitis. He has no open lesions. He is not on blood thinners. He does have a penicillin allergy. He is not currently a smoker. He is not diabetic.     Medications:  Current Outpatient Medications   Medication     LANsoprazole (PREVACID) 30 MG DR capsule     Misc Natural Products (OSTEO BI-FLEX ADV TRIPLE ST PO)     multivitamin w/minerals (THERA-VIT-M) tablet     No current facility-administered medications for this visit.     Allergy:      Allergies   Allergen Reactions     Atorvastatin      Other reaction(s): Myalgias     No Clinical Screening - See Comments Unknown     Antacids     Omeprazole Other (See Comments)     Patient tried and failed this medication, did not relieve heartburn/GERD symptoms.     Pantoprazole Other (See Comments)     Caused stomach ache     Ciprofloxacin Rash     Penicillins Rash     Sulfa Drugs Rash     Medical History:  Past Medical History:   Diagnosis Date     Elevated liver enzymes      Esophageal hiatal hernia      Inguinal hernia     Left     SIMONA (obstructive sleep apnea)     wears cpap at night     Pleomorphic cell sarcoma (H) 07/15/2022    L forearm     SCC (squamous cell carcinoma),  "face     tongue     Surgical History:  Past Surgical History:   Procedure Laterality Date     AS REMOVAL GALLBLADDER       KNEE SURGERY  2010    both knees past 15 yrs     MASS EXCISION  2022    excised mass in L forearm     Social History:  Social History     Tobacco Use     Smoking status: Former     Packs/day: 1.00     Years: 20.00     Pack years: 20.00     Types: Cigarettes, Cigars     Start date: 1961     Quit date: 10/1/1984     Years since quittin.0     Smokeless tobacco: Never     Tobacco comments:     na   Substance Use Topics     Alcohol use: Not Currently     Drug use: Never     ROS:  +left forearm with radiation changes  +left forearm previous surgical incision   +pleomoprhic spindle cell sarcoma    Physical Exam:  BP (!) 172/83   Pulse 64   Ht 1.816 m (5' 11.5\")   Wt 124.8 kg (275 lb 3.2 oz)   SpO2 97%   BMI 37.85 kg/m    Gen: well appearing, NAD. Breathing nonlabored  MSK: left forearm with skin redness and darkening consistent with radiation changes   Incision well healed to left forearm   Radiation dermatitis present to left forearm from elbow to wrist, mostly localized to medial and slightly volar aspect   No palpable masses    Imagin22  MR Left forearm w/wo contrast  IMPRESSION: In this patient with interim histology proven high-grade  spindle cell sarcoma excision, approximately 5 x 24 x 37 mm  postoperative fluid collection collection along the investing layer of  the fascia overlying the extensor carpi ulnaris muscle and extensor  digiti minimi muscle.   a. Wider area of edema and nonmasslike enhancement including along  the investing layer of deep fascia (10.7 cm in proximodistal  dimension). Given the positive surgical margin, residual tumor on a  background of post operative fluid collection and postoperative  inflammation cannot be excluded.    Assessment:   77 year old male with left forearm pleomoprhic spindle cell sarcoma     Plan:   Discussed surgical " reconstruction options at length including: primary closure, delayed complex closure, adjacent tissue advancement, wound vac, skin grafting   Coordination with Dr. Chawla for scheduling of surgery

## 2022-10-12 NOTE — LETTER
Date:October 18, 2022      Provider requested that no letter be sent. Do not send.       [facility]

## 2022-10-13 NOTE — PROCEDURES
Radiotherapy Treatment Summary          Date of Report: 2022     PATIENT: SHANNON MITCHELL  MEDICAL RECORD NO: [id number]  : 1945     DIAGNOSIS: C49.12 Malignant neoplasm of connective and soft tissue of left upper limb, including shoulder  INTENT OF RADIOTHERAPY: Cure  PATHOLOGY:   preop  RT  for high grade sarcoma  of the left forearm                                STAGE: -HIGH-GRADE SPINDLE CELL SARCOMA, FNCLCC grade 2-3, approx. 3.2 cm in linear extent, extending to   inked margins, see comment.  CONCURRENT SYSTEMIC THERAPY:                    Details of the treatments summarized below are found in records kept in the Department of Radiation Oncology at [facility].     Treatment Summary:  Radiation Oncology - Course: 1 Protocol:   Treatment Site Current Dose Modality From To Elapsed Days Fx.  left forearm  5,000 cGy 06 X  8/29/2022 10/04/2022  36 25                Dose per Fraction:     200   Total Dose:      5000 cGy         COMMENTS:                         Tolerated the RT well      ED visits/hospitalizations:0     Missed treatments:0     Acute Toxicity Profile by CTC v5.0: grade 1 skin      PAIN MANAGEMENT:                 na             FOLLOW UP PLAN:      surgery scheduled with preop evals with surgeons     and plastic surgery                     Staff Physician: TERRELL Robles MD                                     Radiation Oncology:  [facility] [address] [place] [address]

## 2022-10-17 ENCOUNTER — ANCILLARY PROCEDURE (OUTPATIENT)
Dept: MRI IMAGING | Facility: CLINIC | Age: 77
End: 2022-10-17
Attending: ORTHOPAEDIC SURGERY
Payer: MEDICARE

## 2022-10-17 ENCOUNTER — OFFICE VISIT (OUTPATIENT)
Dept: ORTHOPEDICS | Facility: CLINIC | Age: 77
End: 2022-10-17
Payer: MEDICARE

## 2022-10-17 ENCOUNTER — TELEPHONE (OUTPATIENT)
Dept: ORTHOPEDICS | Facility: CLINIC | Age: 77
End: 2022-10-17

## 2022-10-17 DIAGNOSIS — C49.12 SARCOMA OF UPPER EXTREMITY, LEFT (H): Primary | ICD-10-CM

## 2022-10-17 DIAGNOSIS — C96.4: ICD-10-CM

## 2022-10-17 PROCEDURE — 73220 MRI UPPR EXTREMITY W/O&W/DYE: CPT | Mod: LT | Performed by: RADIOLOGY

## 2022-10-17 PROCEDURE — G1010 CDSM STANSON: HCPCS | Mod: GC | Performed by: RADIOLOGY

## 2022-10-17 PROCEDURE — A9585 GADOBUTROL INJECTION: HCPCS | Performed by: RADIOLOGY

## 2022-10-17 PROCEDURE — 99214 OFFICE O/P EST MOD 30 MIN: CPT | Performed by: ORTHOPAEDIC SURGERY

## 2022-10-17 RX ORDER — GADOBUTROL 604.72 MG/ML
15 INJECTION INTRAVENOUS ONCE
Status: COMPLETED | OUTPATIENT
Start: 2022-10-17 | End: 2022-10-17

## 2022-10-17 RX ADMIN — GADOBUTROL 12 ML: 604.72 INJECTION INTRAVENOUS at 09:51

## 2022-10-17 NOTE — TELEPHONE ENCOUNTER
-Spoke to the patient & his wife, June, over the phone and performed pre-op teaching.     Teaching Flowsheet   Relevant Diagnosis: Pre-Op TeachingTeaching Topic: Wide excision tumor bed Left forearm, complex primary closure vs VAC placement          Person(s) involved in teaching:   Patient and Wife     Motivation Level:  Asks Questions: Yes  Eager to Learn: Yes  Cooperative: Yes  Receptive (willing/able to accept information): Yes  Any cultural factors/Church beliefs that may influence understanding or compliance? No  Comments:      Patient and wife demonstrates understanding of the following:  Reason for the appointment, diagnosis and treatment plan: Yes  Knowledge of proper use of medications and conditions for which they are ordered (with special attention to potential side effects or drug interactions): Yes  Which situations necessitate calling provider and whom to contact: Yes  What has the patient tried?    Has your symptoms improved?       Teaching Concerns Addressed:   Comments:      Proper use and care of surgical scrub.  (medical equip, care aids, etc.): Yes  Nutritional needs and diet plan: Yes  Pain management techniques: Yes  Wound Care: Yes  How and/when to access community resources: Yes     Instructional Materials Used/Given: Patient and his wife note that they received a surgery packet & two bottles of scrub at today's visit.     In addition to the information above, the following was also discussed:    -important phone numbers/ contact info  -map/ location of surgery  -medications to avoid  -showering instructions  -stop light tool  -pre-op needed within 30 days of surgery (patient notes that he just had a pre-op two weeks ago.  He was going to call his PCP to see if he needs to repeat that or if his PCP can update it virtually).  Patient and wife given the fax # to the [place] pre-op team.  -Covid test: patient to take an at home test, 1-2 days before surgery.  If negative: bring in a photo  of the negative result.  If positive, call our office ASAP/  -patient lists his wife, June, as the person to be driving him home and staying with him after surgery.    Kayla May RN  10/17/2022 3:52 PM        Time spent with patient: 15 minutes.

## 2022-10-17 NOTE — LETTER
10/17/2022         RE: Jose Tiwari  [address] [facility] [address]        Dear Colleague,    Thank you for referring your patient, Jose Tiwari, to the [facility] ORTHOPEDIC CLINIC [place]. Please see a copy of my visit note below.        [facility], Orthopaedic Oncology Surgery Consultation  by Velasquez Chawla M.D.    Jose Tiwari MRN# [id number]    YOB: 1945     Requesting physician: Tre Mcintyre     DX:   77M with L forearm UPS      TREATMENTS:  1. 7/25/2022: Left forearm mass excision (Dr. 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 8/8/2022: Left forearm seroma aspiration  3. 8/29/2022 -10/4/2022 , external beam RT 50 Gy ([facility] [place] [place]    Timothy returns for recheck in regards to his forearm UPS sarcoma that was previously excised with positive margins.  He has completed his radiation and we are planning on proceeding with surgery in the near future.  I explained the risk benefits alternatives and rationale for surgery and the potential risk for complications.    Exam demonstrates recovering forearm after radiation dermatitis.    He has seen our plastic surgery team about the possibility of skin graft.    Plan to proceed with two-stage procedure, first stage being tumor removal (11/11/2022) and either primary closure or wound VAC placement.  If second stage is needed, then he will follow-up with Dr. Campuzano once wound VAC is placed.    Patient understands perioperative plans and consents to proceed.    Kayla MURILLO should contact patient for preoperative teaching and instructions.    MD Abner Tucker Family Professor  Oncology and Adult Reconstructive Surgery  Dept Orthopaedic Surgery, [facility]  [phone] office, [phone] pager  [email or web address]      Total combined visit time and work time before and after clinic visit = 20 min        Again, thank you for allowing me to participate  in the care of your patient.        Sincerely,        Velasquez Chawla MD

## 2022-10-17 NOTE — PROGRESS NOTES
[facility], Orthopaedic Oncology Surgery Consultation  by Velasquez Chawla M.D.    Jose Tiwari MRN# [id number]    YOB: 1945     Requesting physician: Tre Henson  Justin Mcintyre     DX:   77M with L forearm UPS      TREATMENTS:  1. 7/25/2022: Left forearm mass excision (Dr. 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 8/8/2022: Left forearm seroma aspiration  3. 8/29/2022 -10/4/2022 , external beam RT 50 Gy ([facility] [place]    Timothy returns for recheck in regards to his forearm UPS sarcoma that was previously excised with positive margins.  He has completed his radiation and we are planning on proceeding with surgery in the near future.  I explained the risk benefits alternatives and rationale for surgery and the potential risk for complications.    Exam demonstrates recovering forearm after radiation dermatitis.    He has seen our plastic surgery team about the possibility of skin graft.    Plan to proceed with two-stage procedure, first stage being tumor removal (11/11/2022) and either primary closure or wound VAC placement.  If second stage is needed, then he will follow-up with Dr. Campuzano once wound VAC is placed.    Patient understands perioperative plans and consents to proceed.    Kayla MURILLO should contact patient for preoperative teaching and instructions.    MD Abner Tucker Family Professor  Oncology and Adult Reconstructive Surgery  Dept Orthopaedic Surgery, [facility]  [phone] office, [phone] pager  [email or web address]      Total combined visit time and work time before and after clinic visit = 20 min

## 2022-10-17 NOTE — LETTER
Date:October 18, 2022      Patient was self referred, no letter generated. Do not send.        [facility] Health Information

## 2022-10-19 ENCOUNTER — PRE VISIT (OUTPATIENT)
Dept: PLASTIC SURGERY | Facility: CLINIC | Age: 77
End: 2022-10-19

## 2022-10-20 ENCOUNTER — TELEPHONE (OUTPATIENT)
Dept: ORTHOPEDICS | Facility: CLINIC | Age: 77
End: 2022-10-20

## 2022-10-20 NOTE — TELEPHONE ENCOUNTER
Patient is scheduled for surgery with Dr. Chawla    Spoke with: Jose    Date of Surgery: 11/11/2    Location: [facility]    Informed patient they will need an adult  yes    Pre op with Provider n/a    H&P: Complete locally by patient    Pre-procedure COVID-19 Test: Patient will scheudle    Additional imaging/appointments: n/a    Surgery packet: Given in clinic     Additional comments: n/a

## 2022-10-31 NOTE — PROGRESS NOTES
Plan to proceed with two-stage procedure, first stage being tumor removal (11/11/2022) and either primary closure or wound VAC placement.  If second stage is needed, then he will follow-up with Dr. Campuzano once wound VAC is placed.    SURGERY PLAN (PRE-OP PLAN)     Patient Position (indicated by x):  x  Supine      Supine with torso rolled up on a bump      Floppy lateral on torso length bean bag      Lateral decubitus, bean bag, full length      Lateral decubitus, Wixson hip positioner      Safety paddle side supports x 2 clamped to side rail      Lithotomy, both legs in yellow padded leg hennessy      Lithotomy, single leg in yellow padded leg hennessy      Prone on blanket rolls/round gel pad      Prone on Brigido (arched) frame on Kelvin table      Single thigh in orange arthroscopy clamp      Beach chair semi recumbent      Spider limb positioner   x  Arm out on radiolucent arm table      Split drape with top bar      Revision KARL drape with plastic side bags for leg   x  Extremity drape      Shoulder pack drape      Laparotomy drape      Fuller catheter            General Equipment Requests (indicated by x):       C-Arm with C-Armor drape      C-Arm (video capable, [employer] 9900 model)      O-Arm with Stealth imaging      Fracture Table      Kelvin XR Table      SurgiGraphic 6000 (diving board) fluoro table      Cell saver   x  Denton Biopsy trephine set w/ K-wire & pituitary rongeurs   x  Small pituitary rongeur   x  Denton's angled curettes, narrow shaft   x  2-0 silk ties   x  wound vac      Phenol 5%      Ellington BMAC stem cell      Vancomycin 1 gram powder      Zometa 4 mg vials      Depo Medrol steroid      Blunt Pelvic Retractor (.55, Blunt Hohmann with  slight bend)      (1) Portable hand held radiation detector machine for sentinel node biopsy and (2) Lymphazurin      Lambotte Osteotomes         Specimens and cultures (indicated by x):      Tissue cultures, aerobic and anaerobic without gram stain       Frozen section   x  pathology specimens - fresh   x  pathology specimens - formalin      Ki Mi MD  Adult Reconstructive Surgery Fellow  Department of Orthopaedic Surgery, [facility]

## 2022-11-03 ENCOUNTER — TRANSFERRED RECORDS (OUTPATIENT)
Dept: HEALTH INFORMATION MANAGEMENT | Facility: CLINIC | Age: 77
End: 2022-11-03

## 2022-11-03 LAB
ALT SERPL-CCNC: 28 U/L
AST SERPL-CCNC: 22 U/L (ref 10–40)
CREATININE (EXTERNAL): 1.08 MG/DL (ref 0.73–1.18)
GFR ESTIMATED (EXTERNAL): >60 ML/MIN/1.73M2
GLUCOSE (EXTERNAL): 115 MG/DL (ref 70–100)
HBA1C MFR BLD: 5.6 %
LDL CHOLESTEROL DIRECT (EXTERNAL): 145 MG/DL
POTASSIUM (EXTERNAL): 4.3 MMOL/L (ref 3.5–5.1)

## 2022-11-10 ENCOUNTER — TELEPHONE (OUTPATIENT)
Dept: ORTHOPEDICS | Facility: CLINIC | Age: 77
End: 2022-11-10

## 2022-11-10 NOTE — TELEPHONE ENCOUNTER
-A call was placed to the patient and a voicemail message was left.    -Our clinic call back number was provided and the patient was told to ask for Dr. Denton Garza's nurse.    -When the patient calls back, will ask when his pre-op H &P was completed.  Will them call over and have the results faxed to the [facility] [place] Pre-Op team.    -Will await a call back.    Kayla May RN  11/10/2022 10:06 AM

## 2022-11-10 NOTE — TELEPHONE ENCOUNTER
Health Call Center    Phone Message    May a detailed message be left on voicemail: yes     Reason for Call: Other: patient is returning Kayla's call -- his Pre Op physical was completed by Robbie Valerio MD @ [facility]       Action Taken: Message routed to:  [facility] ortho    Travel Screening: Not Applicable     Pt also had a question regarding time of surgery -- did let him know that those are usually given out the day before surgery but if we do have indication on what time then Kayla can address that -- please c/b if there are any other questions

## 2022-11-11 RX ORDER — URSODIOL 250 MG/1
250 TABLET, FILM COATED ORAL DAILY
COMMUNITY

## 2022-11-14 ENCOUNTER — ANESTHESIA EVENT (OUTPATIENT)
Dept: SURGERY | Facility: CLINIC | Age: 77
End: 2022-11-14
Payer: MEDICARE

## 2022-11-15 ENCOUNTER — DOCUMENTATION ONLY (OUTPATIENT)
Dept: OTHER | Facility: CLINIC | Age: 77
End: 2022-11-15

## 2022-11-15 ENCOUNTER — HOSPITAL ENCOUNTER (OUTPATIENT)
Facility: CLINIC | Age: 77
Discharge: HOME | End: 2022-11-15
Attending: ORTHOPAEDIC SURGERY | Admitting: ORTHOPAEDIC SURGERY
Payer: MEDICARE

## 2022-11-15 ENCOUNTER — TELEPHONE (OUTPATIENT)
Dept: ORTHOPEDICS | Facility: CLINIC | Age: 77
End: 2022-11-15

## 2022-11-15 ENCOUNTER — DOCUMENTATION ONLY (OUTPATIENT)
Dept: SURGERY | Facility: CLINIC | Age: 77
End: 2022-11-15

## 2022-11-15 ENCOUNTER — ANESTHESIA (OUTPATIENT)
Dept: SURGERY | Facility: CLINIC | Age: 77
End: 2022-11-15
Payer: MEDICARE

## 2022-11-15 VITALS
RESPIRATION RATE: 12 BRPM | OXYGEN SATURATION: 93 % | HEART RATE: 58 BPM | SYSTOLIC BLOOD PRESSURE: 145 MMHG | TEMPERATURE: 98.1 F | BODY MASS INDEX: 37.18 KG/M2 | WEIGHT: 274.47 LBS | HEIGHT: 72 IN | DIASTOLIC BLOOD PRESSURE: 80 MMHG

## 2022-11-15 DIAGNOSIS — Z98.890 H/O EXCISION OF MASS: Primary | ICD-10-CM

## 2022-11-15 LAB — GLUCOSE BLDC GLUCOMTR-MCNC: 103 MG/DL (ref 70–99)

## 2022-11-15 PROCEDURE — 710N000012 HC RECOVERY PHASE 2, PER MINUTE: Performed by: ORTHOPAEDIC SURGERY

## 2022-11-15 PROCEDURE — 250N000025 HC SEVOFLURANE, PER MIN: Performed by: ORTHOPAEDIC SURGERY

## 2022-11-15 PROCEDURE — 258N000003 HC RX IP 258 OP 636: Performed by: NURSE ANESTHETIST, CERTIFIED REGISTERED

## 2022-11-15 PROCEDURE — 360N000077 HC SURGERY LEVEL 4, PER MIN: Performed by: ORTHOPAEDIC SURGERY

## 2022-11-15 PROCEDURE — 999N000054 HC STATISTIC EKG NON-CHARGEABLE

## 2022-11-15 PROCEDURE — 93010 ELECTROCARDIOGRAM REPORT: CPT | Mod: 59 | Performed by: INTERNAL MEDICINE

## 2022-11-15 PROCEDURE — 250N000011 HC RX IP 250 OP 636: Performed by: NURSE ANESTHETIST, CERTIFIED REGISTERED

## 2022-11-15 PROCEDURE — 250N000011 HC RX IP 250 OP 636: Performed by: ANESTHESIOLOGY

## 2022-11-15 PROCEDURE — 82962 GLUCOSE BLOOD TEST: CPT

## 2022-11-15 PROCEDURE — 250N000013 HC RX MED GY IP 250 OP 250 PS 637: Performed by: PHYSICIAN ASSISTANT

## 2022-11-15 PROCEDURE — 88307 TISSUE EXAM BY PATHOLOGIST: CPT | Mod: TC | Performed by: ORTHOPAEDIC SURGERY

## 2022-11-15 PROCEDURE — 258N000003 HC RX IP 258 OP 636: Performed by: ANESTHESIOLOGY

## 2022-11-15 PROCEDURE — 272N000001 HC OR GENERAL SUPPLY STERILE: Performed by: ORTHOPAEDIC SURGERY

## 2022-11-15 PROCEDURE — 250N000013 HC RX MED GY IP 250 OP 250 PS 637: Performed by: ANESTHESIOLOGY

## 2022-11-15 PROCEDURE — 250N000009 HC RX 250: Performed by: NURSE ANESTHETIST, CERTIFIED REGISTERED

## 2022-11-15 PROCEDURE — 250N000011 HC RX IP 250 OP 636: Performed by: PHYSICIAN ASSISTANT

## 2022-11-15 PROCEDURE — 370N000017 HC ANESTHESIA TECHNICAL FEE, PER MIN: Performed by: ORTHOPAEDIC SURGERY

## 2022-11-15 PROCEDURE — 999N000141 HC STATISTIC PRE-PROCEDURE NURSING ASSESSMENT: Performed by: ORTHOPAEDIC SURGERY

## 2022-11-15 PROCEDURE — 25078 RESECT FORARM/WRIST TUM 3CM>: CPT | Mod: LT | Performed by: ORTHOPAEDIC SURGERY

## 2022-11-15 PROCEDURE — 710N000010 HC RECOVERY PHASE 1, LEVEL 2, PER MIN: Performed by: ORTHOPAEDIC SURGERY

## 2022-11-15 RX ORDER — LIDOCAINE 40 MG/G
CREAM TOPICAL
Status: DISCONTINUED | OUTPATIENT
Start: 2022-11-15 | End: 2022-11-15 | Stop reason: HOSPADM

## 2022-11-15 RX ORDER — CEFAZOLIN SODIUM/WATER 3 G/30 ML
3 SYRINGE (ML) INTRAVENOUS SEE ADMIN INSTRUCTIONS
Status: DISCONTINUED | OUTPATIENT
Start: 2022-11-15 | End: 2022-11-15 | Stop reason: HOSPADM

## 2022-11-15 RX ORDER — HYDRALAZINE HYDROCHLORIDE 20 MG/ML
2.5-5 INJECTION INTRAMUSCULAR; INTRAVENOUS EVERY 10 MIN PRN
Status: DISCONTINUED | OUTPATIENT
Start: 2022-11-15 | End: 2022-11-15 | Stop reason: HOSPADM

## 2022-11-15 RX ORDER — ACETAMINOPHEN 325 MG/1
650 TABLET ORAL
Status: DISCONTINUED | OUTPATIENT
Start: 2022-11-15 | End: 2022-11-15 | Stop reason: HOSPADM

## 2022-11-15 RX ORDER — FENTANYL CITRATE 50 UG/ML
25 INJECTION, SOLUTION INTRAMUSCULAR; INTRAVENOUS EVERY 5 MIN PRN
Status: DISCONTINUED | OUTPATIENT
Start: 2022-11-15 | End: 2022-11-15 | Stop reason: HOSPADM

## 2022-11-15 RX ORDER — FENTANYL CITRATE 50 UG/ML
25 INJECTION, SOLUTION INTRAMUSCULAR; INTRAVENOUS
Status: DISCONTINUED | OUTPATIENT
Start: 2022-11-15 | End: 2022-11-15 | Stop reason: HOSPADM

## 2022-11-15 RX ORDER — OXYCODONE HYDROCHLORIDE 5 MG/1
5 TABLET ORAL EVERY 4 HOURS PRN
Status: DISCONTINUED | OUTPATIENT
Start: 2022-11-15 | End: 2022-11-15 | Stop reason: HOSPADM

## 2022-11-15 RX ORDER — DEXAMETHASONE SODIUM PHOSPHATE 4 MG/ML
INJECTION, SOLUTION INTRA-ARTICULAR; INTRALESIONAL; INTRAMUSCULAR; INTRAVENOUS; SOFT TISSUE PRN
Status: DISCONTINUED | OUTPATIENT
Start: 2022-11-15 | End: 2022-11-15

## 2022-11-15 RX ORDER — KETOROLAC TROMETHAMINE 30 MG/ML
INJECTION, SOLUTION INTRAMUSCULAR; INTRAVENOUS PRN
Status: DISCONTINUED | OUTPATIENT
Start: 2022-11-15 | End: 2022-11-15

## 2022-11-15 RX ORDER — METOPROLOL TARTRATE 1 MG/ML
1-2 INJECTION, SOLUTION INTRAVENOUS EVERY 5 MIN PRN
Status: DISCONTINUED | OUTPATIENT
Start: 2022-11-15 | End: 2022-11-15 | Stop reason: HOSPADM

## 2022-11-15 RX ORDER — ACETAMINOPHEN 325 MG/1
650 TABLET ORAL EVERY 4 HOURS PRN
Qty: 50 TABLET | Refills: 0 | Status: SHIPPED | OUTPATIENT
Start: 2022-11-15 | End: 2023-04-06

## 2022-11-15 RX ORDER — SODIUM CHLORIDE, SODIUM LACTATE, POTASSIUM CHLORIDE, CALCIUM CHLORIDE 600; 310; 30; 20 MG/100ML; MG/100ML; MG/100ML; MG/100ML
INJECTION, SOLUTION INTRAVENOUS CONTINUOUS
Status: DISCONTINUED | OUTPATIENT
Start: 2022-11-15 | End: 2022-11-15 | Stop reason: HOSPADM

## 2022-11-15 RX ORDER — ACETAMINOPHEN 325 MG/1
975 TABLET ORAL ONCE
Status: COMPLETED | OUTPATIENT
Start: 2022-11-15 | End: 2022-11-15

## 2022-11-15 RX ORDER — PROPOFOL 10 MG/ML
INJECTION, EMULSION INTRAVENOUS PRN
Status: DISCONTINUED | OUTPATIENT
Start: 2022-11-15 | End: 2022-11-15

## 2022-11-15 RX ORDER — NALOXONE HYDROCHLORIDE 0.4 MG/ML
0.2 INJECTION, SOLUTION INTRAMUSCULAR; INTRAVENOUS; SUBCUTANEOUS
Status: DISCONTINUED | OUTPATIENT
Start: 2022-11-15 | End: 2022-11-15 | Stop reason: HOSPADM

## 2022-11-15 RX ORDER — HYDROMORPHONE HYDROCHLORIDE 1 MG/ML
0.2 INJECTION, SOLUTION INTRAMUSCULAR; INTRAVENOUS; SUBCUTANEOUS EVERY 5 MIN PRN
Status: DISCONTINUED | OUTPATIENT
Start: 2022-11-15 | End: 2022-11-15 | Stop reason: HOSPADM

## 2022-11-15 RX ORDER — HYDROMORPHONE HCL IN WATER/PF 6 MG/30 ML
0.2 PATIENT CONTROLLED ANALGESIA SYRINGE INTRAVENOUS EVERY 5 MIN PRN
Status: DISCONTINUED | OUTPATIENT
Start: 2022-11-15 | End: 2022-11-15 | Stop reason: HOSPADM

## 2022-11-15 RX ORDER — OXYCODONE HYDROCHLORIDE 5 MG/1
5 TABLET ORAL
Status: COMPLETED | OUTPATIENT
Start: 2022-11-15 | End: 2022-11-15

## 2022-11-15 RX ORDER — FENTANYL CITRATE 50 UG/ML
INJECTION, SOLUTION INTRAMUSCULAR; INTRAVENOUS PRN
Status: DISCONTINUED | OUTPATIENT
Start: 2022-11-15 | End: 2022-11-15

## 2022-11-15 RX ORDER — OXYCODONE HYDROCHLORIDE 5 MG/1
5-10 TABLET ORAL EVERY 6 HOURS PRN
Qty: 10 TABLET | Refills: 0 | Status: SHIPPED | OUTPATIENT
Start: 2022-11-15 | End: 2023-04-06

## 2022-11-15 RX ORDER — NALOXONE HYDROCHLORIDE 0.4 MG/ML
0.4 INJECTION, SOLUTION INTRAMUSCULAR; INTRAVENOUS; SUBCUTANEOUS
Status: DISCONTINUED | OUTPATIENT
Start: 2022-11-15 | End: 2022-11-15 | Stop reason: HOSPADM

## 2022-11-15 RX ORDER — LIDOCAINE HYDROCHLORIDE 20 MG/ML
INJECTION, SOLUTION INFILTRATION; PERINEURAL PRN
Status: DISCONTINUED | OUTPATIENT
Start: 2022-11-15 | End: 2022-11-15

## 2022-11-15 RX ORDER — CEFAZOLIN SODIUM/WATER 3 G/30 ML
3 SYRINGE (ML) INTRAVENOUS
Status: COMPLETED | OUTPATIENT
Start: 2022-11-15 | End: 2022-11-15

## 2022-11-15 RX ORDER — ONDANSETRON 4 MG/1
4 TABLET, ORALLY DISINTEGRATING ORAL EVERY 8 HOURS PRN
Qty: 4 TABLET | Refills: 0 | Status: SHIPPED | OUTPATIENT
Start: 2022-11-15 | End: 2023-04-06

## 2022-11-15 RX ORDER — ONDANSETRON 4 MG/1
4 TABLET, ORALLY DISINTEGRATING ORAL EVERY 30 MIN PRN
Status: DISCONTINUED | OUTPATIENT
Start: 2022-11-15 | End: 2022-11-15 | Stop reason: HOSPADM

## 2022-11-15 RX ORDER — AMOXICILLIN 250 MG
1-2 CAPSULE ORAL 2 TIMES DAILY
Qty: 30 TABLET | Refills: 0 | Status: SHIPPED | OUTPATIENT
Start: 2022-11-15 | End: 2023-04-06

## 2022-11-15 RX ORDER — ONDANSETRON 2 MG/ML
INJECTION INTRAMUSCULAR; INTRAVENOUS PRN
Status: DISCONTINUED | OUTPATIENT
Start: 2022-11-15 | End: 2022-11-15

## 2022-11-15 RX ORDER — ONDANSETRON 2 MG/ML
4 INJECTION INTRAMUSCULAR; INTRAVENOUS EVERY 30 MIN PRN
Status: DISCONTINUED | OUTPATIENT
Start: 2022-11-15 | End: 2022-11-15 | Stop reason: HOSPADM

## 2022-11-15 RX ORDER — HYDROMORPHONE HCL IN WATER/PF 6 MG/30 ML
.2-.4 PATIENT CONTROLLED ANALGESIA SYRINGE INTRAVENOUS EVERY 5 MIN PRN
Status: DISCONTINUED | OUTPATIENT
Start: 2022-11-15 | End: 2022-11-15

## 2022-11-15 RX ADMIN — PHENYLEPHRINE HYDROCHLORIDE 150 MCG: 10 INJECTION INTRAVENOUS at 12:27

## 2022-11-15 RX ADMIN — FENTANYL CITRATE 25 MCG: 50 INJECTION, SOLUTION INTRAMUSCULAR; INTRAVENOUS at 14:04

## 2022-11-15 RX ADMIN — HYDROMORPHONE HYDROCHLORIDE 0.2 MG: 1 INJECTION, SOLUTION INTRAMUSCULAR; INTRAVENOUS; SUBCUTANEOUS at 14:19

## 2022-11-15 RX ADMIN — DEXAMETHASONE SODIUM PHOSPHATE 4 MG: 4 INJECTION, SOLUTION INTRA-ARTICULAR; INTRALESIONAL; INTRAMUSCULAR; INTRAVENOUS; SOFT TISSUE at 12:27

## 2022-11-15 RX ADMIN — FENTANYL CITRATE 25 MCG: 50 INJECTION, SOLUTION INTRAMUSCULAR; INTRAVENOUS at 13:53

## 2022-11-15 RX ADMIN — LIDOCAINE HYDROCHLORIDE 100 MG: 20 INJECTION, SOLUTION INFILTRATION; PERINEURAL at 12:27

## 2022-11-15 RX ADMIN — HYDROMORPHONE HYDROCHLORIDE 0.2 MG: 1 INJECTION, SOLUTION INTRAMUSCULAR; INTRAVENOUS; SUBCUTANEOUS at 14:36

## 2022-11-15 RX ADMIN — PROPOFOL 200 MG: 10 INJECTION, EMULSION INTRAVENOUS at 12:27

## 2022-11-15 RX ADMIN — SUGAMMADEX 250 MG: 100 INJECTION, SOLUTION INTRAVENOUS at 13:29

## 2022-11-15 RX ADMIN — HYDROMORPHONE HYDROCHLORIDE 0.2 MG: 1 INJECTION, SOLUTION INTRAMUSCULAR; INTRAVENOUS; SUBCUTANEOUS at 14:24

## 2022-11-15 RX ADMIN — Medication 3 G: at 12:19

## 2022-11-15 RX ADMIN — OXYCODONE HYDROCHLORIDE 5 MG: 5 TABLET ORAL at 14:33

## 2022-11-15 RX ADMIN — HYDROMORPHONE HYDROCHLORIDE 0.2 MG: 0.2 INJECTION, SOLUTION INTRAMUSCULAR; INTRAVENOUS; SUBCUTANEOUS at 16:01

## 2022-11-15 RX ADMIN — ONDANSETRON 4 MG: 2 INJECTION INTRAMUSCULAR; INTRAVENOUS at 13:17

## 2022-11-15 RX ADMIN — ACETAMINOPHEN 650 MG: 325 TABLET, FILM COATED ORAL at 16:01

## 2022-11-15 RX ADMIN — SODIUM CHLORIDE, POTASSIUM CHLORIDE, SODIUM LACTATE AND CALCIUM CHLORIDE: 600; 310; 30; 20 INJECTION, SOLUTION INTRAVENOUS at 12:19

## 2022-11-15 RX ADMIN — HYDROMORPHONE HYDROCHLORIDE 0.2 MG: 0.2 INJECTION, SOLUTION INTRAMUSCULAR; INTRAVENOUS; SUBCUTANEOUS at 14:52

## 2022-11-15 RX ADMIN — PHENYLEPHRINE HYDROCHLORIDE 200 MCG: 10 INJECTION INTRAVENOUS at 12:45

## 2022-11-15 RX ADMIN — ACETAMINOPHEN 975 MG: 325 TABLET, FILM COATED ORAL at 10:16

## 2022-11-15 RX ADMIN — FENTANYL CITRATE 25 MCG: 50 INJECTION, SOLUTION INTRAMUSCULAR; INTRAVENOUS at 13:59

## 2022-11-15 RX ADMIN — Medication 50 MG: at 12:27

## 2022-11-15 RX ADMIN — HYDROMORPHONE HYDROCHLORIDE 0.2 MG: 1 INJECTION, SOLUTION INTRAMUSCULAR; INTRAVENOUS; SUBCUTANEOUS at 14:12

## 2022-11-15 RX ADMIN — FENTANYL CITRATE 25 MCG: 50 INJECTION, SOLUTION INTRAMUSCULAR; INTRAVENOUS at 13:48

## 2022-11-15 RX ADMIN — FENTANYL CITRATE 100 MCG: 50 INJECTION, SOLUTION INTRAMUSCULAR; INTRAVENOUS at 12:43

## 2022-11-15 RX ADMIN — HYDROMORPHONE HYDROCHLORIDE 0.2 MG: 1 INJECTION, SOLUTION INTRAMUSCULAR; INTRAVENOUS; SUBCUTANEOUS at 14:30

## 2022-11-15 RX ADMIN — KETOROLAC TROMETHAMINE 15 MG: 30 INJECTION, SOLUTION INTRAMUSCULAR at 13:26

## 2022-11-15 ASSESSMENT — ACTIVITIES OF DAILY LIVING (ADL)
ADLS_ACUITY_SCORE: 35

## 2022-11-15 NOTE — PROGRESS NOTES
Prior Authorization **APPROVED**    Authorization Effective Date: 1/1/2022  Authorization Expiration Date: 12/31/2023  Medication: Ondansetron 4mg ODT **APPROVED**  Approved Dose/Quantity: 3 tablets daily  Reference #: CoverMyMeds Key: [id number] - PA Case ID: [id number]   Insurance Company: [employer] - Phone [phone] Fax [phone]  Expected CoPay: $1.30     CoPay Card Available: No    Foundation Assistance Needed: n/a  Which Pharmacy is filling the prescription (Not needed for infusion/clinic administered): [facility] [place], [place] - [address]  Pharmacy Notified: Yes  Patient Notified: Yes  Comments:  Medicare B versus D determination. Discharge pharmacy sent patient with supply while auth is pending. *Retroactively Billed*            Hortensia Benoit CPhT  [place] Discharge Pharmacy Liaison  Pronouns: She/Her/Hers    [facility]  [address]  [address] [address]   [email or web address]  [email or web address]   Phone: [phone]  Pager: [phone]  Fax: [phone]

## 2022-11-15 NOTE — BRIEF OP NOTE
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 Chawla  Assistants:   Gonzalez Goel PA-C    Anesthesia: General  EBL: 2 cc  UOP: see anesthesia record      Implants:   * No implants in log *  Drains: none  Intra-op Labs/Cxs/Specimens:   ID Type Source Tests Collected by Time Destination   1 : left dorsal forearm sarcoma tumor bed see color marking Tissue Forearm, Left SURGICAL PATHOLOGY EXAM Velasquez Chawla MD 11/15/2022  1:13 PM      Complications: No apparent complications during procedure  Findings: Please see dictated operative note for details    Disposition: Stable to PACU, then discharge home today.     Post-Op Plan:  Assessment/Plan: Jose Tiwari is a 77 year old male s/p Procedure(s):  Wide excision tumor bed Left forearm on 11/15/2022 with Dr. Chawla.    Activity: Up with assist and assistive devices as needed until independent.   Weight bearing status: no using the left hand   Antibiotics: Pre op Cefazolin  Diet: Begin with clear fluids and progress diet as tolerated. Bowel regimen. Anti-emetics PRN.    DVT prophylaxis: none  Elevation: left arm    Wound Care: Alginate, tegaderm to be removed 7 days post op  Drains: none  Fuller: none  Pain management: Orals PRN, IV for breakthrough only  X-rays: none  Physical Therapy: n/a   Occupational Therapy: none   Labs: surgical pathology pending   Cultures: none  Consults: none     No future appointments.    Disposition:  Pending progress with pain control on orals, and medical stability, anticipate discharge to Home today.  Follow up: Plan for follow up with Dr. Chawla in 2 weeks     I assisted with positioning, prepping and draping, and closure.      Gonzalez Goel PA-C  11/15/2022 1:52  PM  Physician Assistant   Oncology and Adult Reconstructive Surgery  Dept Orthopaedic Surgery, [facility]     Thank you for allowing me to participate in this patient's care. Please page me directly any questions/concerns.   Securely message with the Vocera Web Console (learn more here)  Text page via [employer] Paging/Directory    If there is no response, if it is a weekend, or if it is during evening hours, please page the orthopaedic surgery resident on call via [employer] Paging/Directory

## 2022-11-15 NOTE — ANESTHESIA PREPROCEDURE EVALUATION
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     Pantoprazole Other (See Comments)     Caused stomach ache     Ciprofloxacin Rash     Penicillins Rash     Sulfa Drugs Rash      Social History     Tobacco Use     Smoking status: Former     Packs/day: 1.00     Years: 20.00     Pack years: 20.00     Types: Cigarettes, Cigars     Start date: 1961     Quit date: 10/1/1984     Years since quittin.1     Smokeless tobacco: Never     Tobacco comments:     na   Substance Use Topics     Alcohol use: Not Currently      Wt Readings from Last 1 Encounters:   10/12/22 124.8 kg (275 lb 3.2 oz)        Anesthesia Evaluation            ROS/MED HX  ENT/Pulmonary:     (+) sleep apnea,     Neurologic:       Cardiovascular:     (+) --CAD (Elevated calcium score) --- (-) angina, angina and murmur   METS/Exercise Tolerance: >4 METS    Hematologic:       Musculoskeletal:       GI/Hepatic:     (+) GERD, Symptomatic,     Renal/Genitourinary:       Endo:       Psychiatric/Substance Use:       Infectious Disease:        Malignancy:       Other:            Physical Exam    Airway        Mallampati: II   TM distance: > 3 FB   Neck ROM: full   Mouth opening: > 3 cm    Respiratory Devices and Support         Dental  no notable dental history       B=Bridge, C=Chipped, L=Loose, M=Missing    Cardiovascular          Rhythm and rate: regular and normal (-) no murmur    Pulmonary           breath sounds clear to auscultation           OUTSIDE LABS:  CBC: No results found for: WBC, HGB, HCT, PLT  BMP: No results found for: NA, POTASSIUM, CHLORIDE, CO2, BUN, CR, GLC  COAGS: No results found for: PTT, INR, FIBR  POC: No results found for: BGM, HCG, HCGS  HEPATIC: No results found for: ALBUMIN, PROTTOTAL, ALT, AST, GGT, ALKPHOS, BILITOTAL, BILIDIRECT, IRVING  OTHER: No results found for: PH, LACT, A1C, TORI, PHOS, MAG, LIPASE, AMYLASE, TSH, T4, T3, CRP, SED    Anesthesia Plan    ASA Status:  2      Anesthesia Type: General.     - Airway: ETT   Induction: Intravenous, Propofol.   Maintenance: Balanced.        Consents            Postoperative Care    Pain management: IV analgesics, Oral pain medications, Multi-modal analgesia.   PONV prophylaxis: Ondansetron (or other 5HT-3), Dexamethasone or Solumedrol     Comments:    Other Comments: General anesthetic with ETT due to significant reflux disease. Discussed plan for general anesthetic with oral ETT. Discussed risks of sore throat, post op pain/nausea, oropharyngeal damage, rare major complications.         H&P reviewed: Unable to attach H&P to encounter due to EHR limitations. H&P Update: appropriate H&P reviewed, patient examined. No interval changes since H&P (within 30 days).         Jj Monzon MD

## 2022-11-15 NOTE — OP NOTE
Procedure Date: 11/15/2022    OPERATIVE REPORT    SURGEON:  Velasquez Chawla M.D.     ASSISTANT:  RICO White.  No qualified surgical resident was available for assistance, and therefore, surgical assistance was provided by a physician assistant.    PREOPERATIVE DIAGNOSES:    1.  Undifferentiated pleomorphic sarcoma of left forearm, previously excised with positive margins.  2.  Status post external beam radiation treatment 50 Gy.    POSTOPERATIVE DIAGNOSES:    1.  Undifferentiated pleomorphic sarcoma of left forearm, previously excised with positive margins.  2.  Status post external beam radiation treatment 50 Gy.    PROCEDURE PERFORMED:  Wide local excision of previous surgical tumor bed, left dorsal forearm, 10 x 5 cm deep subfascial.    INDICATIONS:  This 77-year-old male previously underwent excision of a mass within his left forearm extending down to the muscular fascia.  The muscular fascia was not breached, and pathology revealed evidence of an undifferentiated pleomorphic sarcoma with positive margins.  He had a postoperative wound seroma develop that was aspirated.  He then underwent external beam radiation treatment to the forearm area.  It was overlying the dorsal aspect of the forearm adjacent to the ulna.    Having completed external beam radiation, now presents for surgical tumor bed excision.    DESCRIPTION OF PROCEDURE:  The patient was placed on the operating table in supine position.  After induction of general anesthesia, the left upper extremity was prepped and draped in sterile manner.  The forearm was then draped across the chest, and dorsal aspect of the forearm adjacent to the ulna was outlined.  The previous surgical incision was longitudinal in nature.  An elliptical incision encompassing the previous surgical incision and extending for 2 cm on either side of the wound was then performed and created.  The total length of the incision was approximately 10 cm.  The dissection plane was  taken down through the subcutaneous tissues down to the muscular fascia, which was then incised.  The muscular fascia was then reflected off of the extensor carpi ulnaris muscle, as well as the interosseous membrane and then off of the forearm volar flexor muscle mass.  Bleeding points were encountered, were ligated.  Once the subfascial dissection plane had been completed, the mass was now completely free and mobilized and removed from the surgical field.  A painted colored margin marker set was used to outline the anatomical boundaries.  A double stitch marked the distal end of the specimen.    Thorough irrigation of the wound area and bed was performed.  Hemostasis was judged to be adequate.  Wound closure was now accomplished.    I was able to primarily close the wound by mobilizing and bringing together the subcutaneous tissue of the medial and lateral flaps.  Interrupted buried 2-0 PDS sutures were used to accomplish this purpose.  This gradually brought the wound together for its entire length.  The skin was reapproximated with a running 3-0 subcuticular suture and skin glue afterwards.    To facilitate wound healing, a sterile dressing was applied and then a volar plaster splint maintaining the wrist in neutral position was then performed to minimize motion of the underlying tissues and facilitate maximized healing potential.  The patient will keep the splint on for two weeks and return to see me in 2 weeks' time for reevaluation.    Velasquez Chawla MD        D: 11/15/2022   T: 11/15/2022   MT: IVÁN    Name:     ARNALDO MITCHELLROCHELLE CARLOS  MRN:      [id number]        Account:        [id number]   :      1945           Procedure Date: 11/15/2022     Document: [id number]

## 2022-11-15 NOTE — ANESTHESIA CARE TRANSFER NOTE
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    Anesthesia Type:   General     Note:    Oropharynx: spontaneously breathing  Level of Consciousness: awake  Oxygen Supplementation: face mask  Level of Supplemental Oxygen (L/min / FiO2): 8  Independent Airway: airway patency satisfactory and stable  Dentition: dentition unchanged  Vital Signs Stable: post-procedure vital signs reviewed and stable  Report to RN Given: handoff report given  Patient transferred to: PACU    Handoff Report: Identifed the Patient, Identified the Reponsible Provider, Reviewed the pertinent medical history, Discussed the surgical course, Reviewed Intra-OP anesthesia mangement and issues during anesthesia, Set expectations for post-procedure period and Allowed opportunity for questions and acknowledgement of understanding      Vitals:  Vitals Value Taken Time   /90    Temp 36.6    Pulse 73    Resp 18 11/15/22 1341   SpO2 97 % 11/15/22 1341   Vitals shown include unvalidated device data.    Electronically Signed By: MONY Brown CRNA  November 15, 2022  1:42 PM

## 2022-11-15 NOTE — TELEPHONE ENCOUNTER
I called the patient to schedule them for their 2 week post op visit with Gonzalez Goel PA-C. I left a voicemail letting them know that they are on the schedule for 12:00pm on Monday November 28, 2022 and that they can call us back at [phone] if they need to change the appointment. They can bee seen by Gonzalez anytime in the afternoon that day despite their clinic ending at 12:00. Otherwise, we will see them then.     Patricio Mclean, EMT

## 2022-11-15 NOTE — ANESTHESIA POSTPROCEDURE EVALUATION
Patient: Jose Tiwari    Procedure: Procedure(s):  Wide excision tumor bed Left forearm       Anesthesia Type:  General    Note:  Disposition: Outpatient   Postop Pain Control: Uneventful            Sign Out: Well controlled pain   PONV: No   Neuro/Psych: Uneventful            Sign Out: Acceptable/Baseline neuro status   Airway/Respiratory: Uneventful            Sign Out: Acceptable/Baseline resp. status   CV/Hemodynamics: Uneventful            Sign Out: Acceptable CV status; No obvious hypovolemia; No obvious fluid overload   Other NRE: NONE   DID A NON-ROUTINE EVENT OCCUR? No           Last vitals:  Vitals Value Taken Time   /116 11/15/22 1500   Temp 36.6  C (97.9  F) 11/15/22 1500   Pulse 60 11/15/22 1506   Resp 29 11/15/22 1507   SpO2 92 % 11/15/22 1507   Vitals shown include unvalidated device data.    Electronically Signed By: Jj Monzon MD  November 15, 2022  4:05 PM

## 2022-11-15 NOTE — PROGRESS NOTES
Prior Authorization **INITIATED**    Medication: Ondansetron 4mg ODT  Insurance Company: [employer] - Phone [phone] Fax [phone]  Pharmacy Filling the Rx: [place], [place] - [address]  Filling Pharmacy Phone: [phone]  Filling Pharmacy Fax: [phone]  Start Date: 11/15/2022  Reference #: CoverMyMeds Key: [id number] - PA Case ID: [id number]  Comments:  Medicare B versus D determination. Discharge pharmacy sent patient with supply while auth is pending. Will retroactively bill once determination received.        Hortensia Benoit CPhT  [place] Discharge Pharmacy Liaison  Pronouns: She/Her/Hers    [facility]  [address] [place] [address]  [address], [place] [address]   [email or web address]  [email or web address]   Phone: [phone]  Pager: [phone]  Fax: [phone]

## 2022-11-15 NOTE — ANESTHESIA PROCEDURE NOTES
Airway       Patient location during procedure: OR       Procedure Start/Stop Times: 11/15/2022 12:30 PM  Staff -        Resident/Fellow: Stacie Hunt       Performed By: SRNAIndications and Patient Condition       Indications for airway management: eros-procedural       Induction type:intravenous       Mask difficulty assessment: 1 - vent by mask    Final Airway Details       Final airway type: endotracheal airway       Successful airway: ETT - single  Endotracheal Airway Details        ETT size (mm): 8.0       Cuffed: yes       Successful intubation technique: video laryngoscopy       VL Blade Size: MAC 4       Grade View of Cords: 1       Adjucts: stylet       Position: Right       Measured from: lips       Secured at (cm): 24       Bite block used: None    Post intubation assessment        Placement verified by: capnometry, equal breath sounds and chest rise        Number of attempts at approach: 1       Secured with: silk tape       Ease of procedure: easy       Dentition: Intact and Unchanged    Medication(s) Administered   Medication Administration Time: 11/15/2022 12:30 PM

## 2022-11-15 NOTE — DISCHARGE INSTRUCTIONS

## 2022-11-16 LAB
ATRIAL RATE - MUSE: 51 BPM
DIASTOLIC BLOOD PRESSURE - MUSE: NORMAL MMHG
INTERPRETATION ECG - MUSE: NORMAL
P AXIS - MUSE: 76 DEGREES
PR INTERVAL - MUSE: 204 MS
QRS DURATION - MUSE: 78 MS
QT - MUSE: 446 MS
QTC - MUSE: 411 MS
R AXIS - MUSE: 2 DEGREES
SYSTOLIC BLOOD PRESSURE - MUSE: NORMAL MMHG
T AXIS - MUSE: 6 DEGREES
VENTRICULAR RATE- MUSE: 51 BPM

## 2022-11-18 LAB
PATH REPORT.COMMENTS IMP SPEC: NORMAL
PATH REPORT.COMMENTS IMP SPEC: NORMAL
PATH REPORT.FINAL DX SPEC: NORMAL
PATH REPORT.GROSS SPEC: NORMAL
PATH REPORT.MICROSCOPIC SPEC OTHER STN: NORMAL
PATH REPORT.RELEVANT HX SPEC: NORMAL
PHOTO IMAGE: NORMAL

## 2022-11-18 PROCEDURE — 88307 TISSUE EXAM BY PATHOLOGIST: CPT | Mod: 26 | Performed by: PATHOLOGY

## 2022-11-20 NOTE — RESULT ENCOUNTER NOTE
Dear Jose Tiwari:    The pathology report showed that there is no remaining tumor in the tissue that was removed.  That is great news for you.      Best regards,    MD Abner Tucker Family Professor  Oncology and Adult Reconstructive Surgery  Dept Orthopaedic Surgery, [facility]  [phone] office  [email or web address]

## 2022-11-28 ENCOUNTER — OFFICE VISIT (OUTPATIENT)
Dept: ORTHOPEDICS | Facility: CLINIC | Age: 77
End: 2022-11-28
Payer: MEDICARE

## 2022-11-28 DIAGNOSIS — C49.12 SARCOMA OF UPPER EXTREMITY, LEFT (H): Primary | ICD-10-CM

## 2022-11-28 PROCEDURE — 99024 POSTOP FOLLOW-UP VISIT: CPT | Performed by: PHYSICIAN ASSISTANT

## 2022-11-28 NOTE — LETTER
11/28/2022         RE: Jose Tiwari  [address] [facility] [address]        Dear Colleague,    Thank you for referring your patient, Jose Tiwari, to the [facility] [place]. Please see a copy of my visit note below.        [facility], Orthopaedic Oncology Surgery Consultation  by Velasquez Chawla M.D.    Jsoe Tiwari MRN# [id number]    YOB: 1945     Requesting physician: Tre Mcintyre     DX:   77M with L forearm [employer]      TREATMENTS:  1. 7/25/2022: Left forearm mass excision (Dr. 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 8/8/2022: Left forearm seroma aspiration  3. 8/29/2022 -10/4/2022 , external beam RT 50 Gy ([facility]) [facility]  4. 11/15/2022, wide excision tumor bed from left forearm, Dr. Chawla [facility]    HPI:  Dain is a 77-year-old male who is 2-week status post excision of high-grade spindle cell sarcoma from left forearm.  He is left the splint clean dry and intact and minimize use of the left hand.  His pain is well controlled.  He denies any fever, malaise, chest pain or shortness of breath.    Physical exam:  The incision is clean dry and intact with no erythema, dehiscence, ecchymosis or discharge.  Sensation to light touch intact of the left forearm and hand.  Radial and ulnar pulses 2+.  Cap refill less than 3 seconds.  5 out of 5 strength with okay sign and  strength.    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   Electronically signed by Brain Arguelles MD on 8/18/2022 at  3:08 PM   Comment     Immunohistochemistry studies, performed at the outside institution with appropriate controls, demonstrate that the lesional cells  are patchy positive for Factor XIIIa, weakly and patchy positive for SMA, and focally positive for desmin.  They are negative for all other stains including S100, CD34, CD31, cytokeratin AE1/AE3, CK7 and pan-melanoma.  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       Assessment:  77-year-old male who is 2-week status post excision of high-grade small cell sarcoma from left forearm.  Recovering appropriately    Plan:  Per Dr. Chawla's most of the patient appears he excised all of the tumor bed.  The incision is healing well so he can get it wet but should avoid soaking for 2 weeks.  I recommend he avoid heavy lifting for 2 more weeks.  He can continue to increase his activities as tolerated.  Reviewed surveillance for this high-grade spindle cell sarcoma which will be a follow-up MRI of the left forearm and CT of the chest in 4 months.  The patient was agreed with the plan he will follow-up in 4 months.    Gonzalez Goel PA-C  11/29/2022 12:53 PM  Physician Assistant   Oncology and Adult Reconstructive Surgery  Dept Orthopaedic Surgery, [facility]             Again, thank you for allowing me to participate in the care of your patient.        Sincerely,        Gonzalez Goel PA-C

## 2022-11-28 NOTE — LETTER
Date:December 1, 2022      Patient was self referred, no letter generated. Do not send.        [facility] Health Information

## 2022-11-29 NOTE — PROGRESS NOTES
[facility] Physicians, Orthopaedic Oncology Surgery Consultation  by Velasquez Chawla M.D.    Jose Tiwari MRN# [id number]    YOB: 1945     Requesting physician: Tre Henson  Justin Mcintyre     DX:   77M with L forearm UPS      TREATMENTS:  1. 7/25/2022: Left forearm mass excision (Dr. 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 8/8/2022: Left forearm seroma aspiration  3. 8/29/2022 -10/4/2022 , [place] [facility]  4. 11/15/2022, wide excision tumor bed from left forearm, Dr. Chawla [facility]    HPI:  Dain is a 77-year-old male who is 2-week status post excision of high-grade spindle cell sarcoma from left forearm.  He is left the splint clean dry and intact and minimize use of the left hand.  His pain is well controlled.  He denies any fever, malaise, chest pain or shortness of breath.    Physical exam:  The incision is clean dry and intact with no erythema, dehiscence, ecchymosis or discharge.  Sensation to light touch intact of the left forearm and hand.  Radial and ulnar pulses 2+.  Cap refill less than 3 seconds.  5 out of 5 strength with okay sign and  strength.    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 3.2 cm in linear extent, extending to inked margins, see comment.   Electronically signed by Brain Arguelles MD on 8/18/2022 at  3:08 PM   Comment     Immunohistochemistry studies, performed at the outside institution with appropriate controls, demonstrate that the lesional cells are patchy positive for Factor XIIIa, weakly and patchy positive for SMA, and focally positive for desmin.  They are negative for all other stains including S100, CD34, CD31, cytokeratin AE1/AE3, CK7 and pan-melanoma.  The neoplasm shows morphologic  features reminiscent of high-grade leiomyosarcoma, however, differentiation markers appear to be largely lost.       Assessment:  77-year-old male who is 2-week status post excision of high-grade small cell sarcoma from left forearm.  Recovering appropriately    Plan:  Per Dr. Chawla's most of the patient appears he excised all of the tumor bed.  The incision is healing well so he can get it wet but should avoid soaking for 2 weeks.  I recommend he avoid heavy lifting for 2 more weeks.  He can continue to increase his activities as tolerated.  Reviewed surveillance for this high-grade spindle cell sarcoma which will be a follow-up MRI of the left forearm and CT of the chest in 4 months.  The patient was agreed with the plan he will follow-up in 4 months.    Gonzalez Goel PA-C  11/29/2022 12:53 PM  Physician Assistant   Oncology and Adult Reconstructive Surgery  Dept Orthopaedic Surgery, [facility] Physicians

## 2022-12-01 NOTE — NURSING NOTE
Chief Complaint   Patient presents with     RECHECK     Review same day MRI and discuss surgical plan, follow up after radiation       77 year old  1945    There were no vitals taken for this visit.    Past Surgical History:   Procedure Laterality Date     AS REMOVAL GALLBLADDER       KNEE SURGERY  2010    both knees past 15 yrs     MASS EXCISION  07/25/2022    excised mass in L forearm          Pain Assessment  Patient Currently in Pain: Jaquanjoan                 [facility] [address] [place] [address]        Allergies   Allergen Reactions     Atorvastatin      Other reaction(s): Myalgias     No Clinical Screening - See Comments Unknown     Antacids     Omeprazole Other (See Comments)     Patient tried and failed this medication, did not relieve heartburn/GERD symptoms.     Pantoprazole Other (See Comments)     Caused stomach ache     Ciprofloxacin Rash     Penicillins Rash     Sulfa Drugs Rash           Current Outpatient Medications   Medication     aspirin (ASA) 81 MG chewable tablet     LANsoprazole (PREVACID) 30 MG DR capsule     Misc Natural Products (OSTEO BI-FLEX ADV TRIPLE ST PO)     multivitamin w/minerals (THERA-VIT-M) tablet     No current facility-administered medications for this visit.             Questionnaires:      Promis 10 Assessment    PROMIS 10 8/8/2022   In general, would you say your health is: Excellent   In general, would you say your quality of life is: Very good   In general, how would you rate your physical health? Excellent   In general, how would you rate your mental health, including your mood and your ability to think? Excellent   In general, how would you rate your satisfaction with your social activities and relationships? Excellent   In general, please rate how well you carry out your usual social activities and roles Excellent   To what extent are you able to carry out your everyday physical activities such as walking, climbing stairs, carrying  Spoke with Theresa, and informed her that PCP wanted her to take two different antibiotics that were sent to the pharmacy. Read MD note.     Can switch to different antibiotics. Suprax and doxycyline sent. Can take probiotic or have yogurt while on antibiotics.    groceries, or moving a chair? Completely   How often have you been bothered by emotional problems such as feeling anxious, depressed or irritable? Never   How would you rate your fatigue on average? Mild   How would you rate your pain on average?   0 = No Pain  to  10 = Worst Imaginable Pain 1   In general, would you say your health is: 5   In general, would you say your quality of life is: 4   In general, how would you rate your physical health? 5   In general, how would you rate your mental health, including your mood and your ability to think? 5   In general, how would you rate your satisfaction with your social activities and relationships? 5   In general, please rate how well you carry out your usual social activities and roles. (This includes activities at home, at work and in your community, and responsibilities as a parent, child, spouse, employee, friend, etc.) 5   To what extent are you able to carry out your everyday physical activities such as walking, climbing stairs, carrying groceries, or moving a chair? 5   In the past 7 days, how often have you been bothered by emotional problems such as feeling anxious, depressed, or irritable? 1   In the past 7 days, how would you rate your fatigue on average? 2   In the past 7 days, how would you rate your pain on average, where 0 means no pain, and 10 means worst imaginable pain? 1   Global Mental Health Score 19   Global Physical Health Score 18   PROMIS TOTAL - SUBSCORES 37   Some recent data might be hidden

## 2023-04-06 ENCOUNTER — OFFICE VISIT (OUTPATIENT)
Dept: ORTHOPEDICS | Facility: CLINIC | Age: 78
End: 2023-04-06
Payer: MEDICARE

## 2023-04-06 ENCOUNTER — ANCILLARY PROCEDURE (OUTPATIENT)
Dept: CT IMAGING | Facility: CLINIC | Age: 78
End: 2023-04-06
Attending: PHYSICIAN ASSISTANT
Payer: MEDICARE

## 2023-04-06 ENCOUNTER — ANCILLARY PROCEDURE (OUTPATIENT)
Dept: MRI IMAGING | Facility: CLINIC | Age: 78
End: 2023-04-06
Attending: PHYSICIAN ASSISTANT
Payer: MEDICARE

## 2023-04-06 DIAGNOSIS — C49.12 SARCOMA OF UPPER EXTREMITY, LEFT (H): Primary | ICD-10-CM

## 2023-04-06 DIAGNOSIS — C49.12 SARCOMA OF UPPER EXTREMITY, LEFT (H): ICD-10-CM

## 2023-04-06 PROCEDURE — 99213 OFFICE O/P EST LOW 20 MIN: CPT | Mod: GC | Performed by: ORTHOPAEDIC SURGERY

## 2023-04-06 PROCEDURE — A9585 GADOBUTROL INJECTION: HCPCS | Performed by: RADIOLOGY

## 2023-04-06 PROCEDURE — G1010 CDSM STANSON: HCPCS | Performed by: RADIOLOGY

## 2023-04-06 PROCEDURE — 73220 MRI UPPR EXTREMITY W/O&W/DYE: CPT | Mod: LT | Performed by: RADIOLOGY

## 2023-04-06 PROCEDURE — 71250 CT THORAX DX C-: CPT | Mod: MG | Performed by: RADIOLOGY

## 2023-04-06 PROCEDURE — G1010 CDSM STANSON: HCPCS | Mod: GC | Performed by: RADIOLOGY

## 2023-04-06 RX ORDER — GADOBUTROL 604.72 MG/ML
15 INJECTION INTRAVENOUS ONCE
Status: COMPLETED | OUTPATIENT
Start: 2023-04-06 | End: 2023-04-06

## 2023-04-06 RX ORDER — LISINOPRIL 20 MG/1
10 TABLET ORAL DAILY
COMMUNITY
Start: 2022-12-01 | End: 2024-08-15

## 2023-04-06 RX ADMIN — GADOBUTROL 12 ML: 604.72 INJECTION INTRAVENOUS at 12:25

## 2023-04-06 NOTE — LETTER
4/6/2023         RE: Jose Tiwari  [address] [facility] [address]        Dear Colleague,    Thank you for referring your patient, Jose Tiwari, to the [facility] [place]. Please see a copy of my visit note below.        [facility] Physicians, Orthopaedic Oncology Surgery Consultation  by Velasquez Chawla M.D.    Jose Tiwari MRN# [id number]    YOB: 1945     Requesting physician: Tre Mcintyre     DX:   77M with L forearm UPS , high grade localized     TREATMENTS:  1. 7/25/2022: Left forearm mass excision (Dr. 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 8/8/2022: Left forearm seroma aspiration  3. 8/29/2022 -10/4/2022 , external beam RT 50 Gy ([facility]) [facility]  4. 11/15/2022, wide excision tumor bed from left forearm, Dr. 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  He has no pain, with the exception of intermittent pain when bumping into things.  He is overall happy with his progress.    Physical exam:  The incision is well-healed with no erythema, dehiscence, ecchymosis or discharge.  Sensation to light touch intact of the left forearm and hand.  Radial and ulnar pulses 2+.  Cap refill less than 3 seconds.  5 out of 5 strength with okay sign and  strength.    MRI of the left forearm demonstrate no recurrence of the tumor  CT chest demonstrate no metastatic lesions    Assessment:  77M with L forearm UPS, status post wide excision tumor bed (11/15/2022).  He is doing well.    Plan:  Weight-bear as tolerated  Follow-up via virtual visit at the 1 year anniversary (November 2023)  Repeat MRI left forearm and CT chest before follow-up visit    Ki Mi MD  Adult Reconstructive Surgery Fellow  Department of Orthopaedic Surgery, [facility]    Total combined visit time and work time before and after clinic visit on  encounter date = 20 min  Attending MD (Dr. Velasquez Chawla) Attestation :  This patient was seen and evaluated by me including a history, exam, and interpretation of all imaging and/or lab data.  I formulated the treatment plan along with either a physician's assistant (AKIL) or training physician (resident/fellow), who also saw the patient. That individual or a scribe has documented the visit in the attached note which I approve.    Velasquez Chawla MD  [facility] Professor  Oncology and Adult Reconstructive Surgery  Dept Orthopaedic Surgery, [facility] Physicians  [phone] office, [phone] pager  [email or web address]                    Again, thank you for allowing me to participate in the care of your patient.        Sincerely,        Velasquez Chawla MD

## 2023-04-06 NOTE — PROGRESS NOTES
[facility] Physicians, Orthopaedic Oncology Surgery Consultation  by Velasquez Chawla M.D.    Jose Tiwari MRN# [id number]    YOB: 1945     Requesting physician: Tre Mcintyre     DX:   77M with L forearm UPS , high grade localized     TREATMENTS:  1. 7/25/2022: Left forearm mass excision (Dr. 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 8/8/2022: Left forearm seroma aspiration  3. 8/29/2022 -10/4/2022 , external beam RT 50 Gy ([facility] [facility]  4. 11/15/2022, wide excision tumor bed from left forearm, Dr. 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  He has no pain, with the exception of intermittent pain when bumping into things.  He is overall happy with his progress.    Physical exam:  The incision is well-healed with no erythema, dehiscence, ecchymosis or discharge.  Sensation to light touch intact of the left forearm and hand.  Radial and ulnar pulses 2+.  Cap refill less than 3 seconds.  5 out of 5 strength with okay sign and  strength.    MRI of the left forearm demonstrate no recurrence of the tumor  CT chest demonstrate no metastatic lesions    Assessment:  77M with L forearm UPS, status post wide excision tumor bed (11/15/2022).  He is doing well.    Plan:  Weight-bear as tolerated  Follow-up via virtual visit at the 1 year anniversary (November 2023)  Repeat MRI left forearm and CT chest before follow-up visit    Ki Mi MD  Adult Reconstructive Surgery Fellow  Department of Orthopaedic Surgery, [facility]    Total combined visit time and work time before and after clinic visit on encounter date = 20 min  Attending MD (Dr. Velasquez Chawla) Attestation :  This patient was seen and evaluated by me including a history, exam, and interpretation of all imaging and/or lab data.  I formulated the treatment plan along with either a physician's  assistant (PATANI) or training physician (resident/fellow), who also saw the patient. That individual or a scribe has documented the visit in the attached note which I approve.    Velasquez Chawla MD  [facility] Family Professor  Oncology and Adult Reconstructive Surgery  Dept Orthopaedic Surgery, [facility] Physicians  [phone] office, [phone] pager  [email or web address]

## 2023-04-06 NOTE — NURSING NOTE
Chief Complaint   Patient presents with     RECHECK     MRI left forearm and chest CT results. 4 Months post op from wide excision tumor bed left forearm. DOS: 11/15/2022       78 year old  1945    There were no vitals taken for this visit.    Past Surgical History:   Procedure Laterality Date     AS REMOVAL GALLBLADDER       EXCISE SOFT TISSUE TUMOR FOREARM Left 11/15/2022    Procedure: Wide excision tumor bed Left forearm;  Surgeon: Velasquez Chawla MD;  Location: [facility] OR     KNEE SURGERY  2010    both knees past 15 yrs     MASS EXCISION  07/25/2022    excised mass in L forearm              Pain Assessment  Patient Currently in Pain: Lucinda                           [facility] [address] [place] [address] - [address] [place] [address]        Allergies   Allergen Reactions     Atorvastatin      Other reaction(s): Myalgias     No Clinical Screening - See Comments Unknown     Antacids     Omeprazole Other (See Comments)     Patient tried and failed this medication, did not relieve heartburn/GERD symptoms.     Pantoprazole Other (See Comments)     Caused stomach ache     Ciprofloxacin Rash     Penicillins Rash     Sulfa Drugs Rash           Current Outpatient Medications   Medication     aspirin (ASA) 81 MG chewable tablet     LANsoprazole (PREVACID) 30 MG DR capsule     lisinopril (ZESTRIL) 20 MG tablet     Misc Natural Products (OSTEO BI-FLEX ADV TRIPLE ST PO)     multivitamin w/minerals (THERA-VIT-M) tablet     ursodiol (ACTIGALL) 250 MG tablet     No current facility-administered medications for this visit.             Questionnaires:    HOOS Hip Dysfunction & Osteoarthritis Outcome Questionnaire         View : No data to display.                       KOOS Knee Survey Assessment         View : No data to display.                       Promis 10 Assessment        4/5/2023     6:00 PM   PROMIS 10   In general, would you say your health is: Very good   In general, would you say your quality of life is: Very good   In  general, how would you rate your physical health? Very good   In general, how would you rate your mental health, including your mood and your ability to think? Very good   In general, how would you rate your satisfaction with your social activities and relationships? Very good   In general, please rate how well you carry out your usual social activities and roles Very good   To what extent are you able to carry out your everyday physical activities such as walking, climbing stairs, carrying groceries, or moving a chair? Completely   In the past 7 days, how often have you been bothered by emotional problems such as feeling anxious, depressed, or irritable? Never   In the past 7 days, how would you rate your fatigue on average? Moderate   In the past 7 days, how would you rate your pain on average, where 0 means no pain, and 10 means worst imaginable pain? 3   In general, would you say your health is: 4   In general, would you say your quality of life is: 4   In general, how would you rate your physical health? 4   In general, how would you rate your mental health, including your mood and your ability to think? 4   In general, how would you rate your satisfaction with your social activities and relationships? 4   In general, please rate how well you carry out your usual social activities and roles. (This includes activities at home, at work and in your community, and responsibilities as a parent, child, spouse, employee, friend, etc.) 4   To what extent are you able to carry out your everyday physical activities such as walking, climbing stairs, carrying groceries, or moving a chair? 5   In the past 7 days, how often have you been bothered by emotional problems such as feeling anxious, depressed, or irritable? 1   In the past 7 days, how would you rate your fatigue on average? 3   In the past 7 days, how would you rate your pain on average, where 0 means no pain, and 10 means worst imaginable pain? 3   Global Mental  Health Score 17   Global Physical Health Score 16   PROMIS TOTAL - SUBSCORES 33              Ortho [place] Knee Questionnaire         View : No data to display.

## 2023-04-06 NOTE — LETTER
4/6/2023         RE: Jose Tiwari  [address] [facility] [address]        Dear Colleague,    Thank you for referring your patient, Jose Tiwari, to the [facility] [place]. Please see a copy of my visit note below.        [facility] Physicians, Orthopaedic Oncology Surgery Consultation  by Velasquez Chawla M.D.    Jose Tiwari MRN# [id number]    YOB: 1945     Requesting physician: Tre Mcintyre     DX:   77M with L forearm UPS , high grade localized     TREATMENTS:  1. 7/25/2022: Left forearm mass excision (Dr. 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 8/8/2022: Left forearm seroma aspiration  3. 8/29/2022 -10/4/2022 , external beam RT 50 [place] [place]  4. 11/15/2022, wide excision tumor bed from left forearm, Dr. 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  He has no pain, with the exception of intermittent pain when bumping into things.  He is overall happy with his progress.    Physical exam:  The incision is well-healed with no erythema, dehiscence, ecchymosis or discharge.  Sensation to light touch intact of the left forearm and hand.  Radial and ulnar pulses 2+.  Cap refill less than 3 seconds.  5 out of 5 strength with okay sign and  strength.    MRI of the left forearm demonstrate no recurrence of the tumor  CT chest demonstrate no metastatic lesions    Assessment:  77M with L forearm UPS, status post wide excision tumor bed (11/15/2022).  He is doing well.    Plan:  Weight-bear as tolerated  Follow-up via virtual visit at the 1 year anniversary (November 2023)  Repeat MRI left forearm and CT chest before follow-up visit    Ki Mi MD  Adult Reconstructive Surgery Fellow  Department of Orthopaedic Surgery, [facility] Physicians    Total combined visit time and work time before and after clinic visit on  encounter date = 20 min  Attending MD (Dr. Velasquez Chawla) Attestation :  This patient was seen and evaluated by me including a history, exam, and interpretation of all imaging and/or lab data.  I formulated the treatment plan along with either a physician's assistant (AKIL) or training physician (resident/fellow), who also saw the patient. That individual or a scribe has documented the visit in the attached note which I approve.    Velasquez Chawla MD  [facility] Professor  Oncology and Adult Reconstructive Surgery  Dept Orthopaedic Surgery, [facility] Physicians  [phone] office, [phone] pager  [email or web address]                    Again, thank you for allowing me to participate in the care of your patient.        Sincerely,        Velasquez Chawla MD

## 2023-10-11 ENCOUNTER — MYC MEDICAL ADVICE (OUTPATIENT)
Dept: ORTHOPEDICS | Facility: CLINIC | Age: 78
End: 2023-10-11
Payer: MEDICARE

## 2023-10-13 ENCOUNTER — TELEPHONE (OUTPATIENT)
Dept: ORTHOPEDICS | Facility: CLINIC | Age: 78
End: 2023-10-13
Payer: MEDICARE

## 2023-10-13 NOTE — TELEPHONE ENCOUNTER
- Called Bernadette I let her know these were ordered after reviewing the scans on 4/6 and that per Dr. Chawla's note:  Follow-up via virtual visit at the 1 year anniversary (November 2023)  Repeat MRI left forearm and CT chest before follow-up visit    -This is for surveillance for sarcoma.  And we do want these repeat images.

## 2023-10-13 NOTE — TELEPHONE ENCOUNTER
[facility] Call Center    Phone Message    May a detailed message be left on voicemail: no     Reason for Call: Requesting clarification on orders they received for MRI forearm & CT chest-the orders are dated 04/06/23, but they are seeing in Care Everywhere that those tests were already done on 04/06/23.     Action Taken: Message routed to:  [facility]    Travel Screening: Not Applicable

## 2023-10-21 ENCOUNTER — HEALTH MAINTENANCE LETTER (OUTPATIENT)
Age: 78
End: 2023-10-21

## 2023-11-01 NOTE — PROGRESS NOTES
[facility], Orthopaedic Oncology Surgery Consultation  by Velasquez Chawla M.D.    Jose Tiwari MRN# [id number]    YOB: 1945     Requesting physician: Tre Henson  Justin Mcintyre     DX:   77M with L forearm UPS , high grade localized  Prostate CA       TREATMENTS:  7/25/2022: Left forearm mass excision (Dr. 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  He has no pain, with the exception of intermittent pain when bumping into things.  He is overall happy with his progress.  He is able to perform all regular daily activities and finds he has no restrictions.  In fact he feels as if his function is no different than it was before his sarcoma cancer treatment    He has a new diagnosis of prostate carcinoma and is a undergone staging which revealed localized disease and is preparing for local treatment options.    Physical exam:  Limited exam due to virtual visit.  Patient reports that the incision is well-healed with no erythema, dehiscence, ecchymosis or discharge.  Sensation to light touch intact of the left forearm and hand.      MRI of the left forearm demonstrate no recurrence of the tumor  EXAM: MR RADIUS ULNA LT W/WO IV CONT  LOCATION: [facility]  DATE: 10/30/2023    INDICATION: 6 month follow up exam.  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  COMPARISON: 4/6/23, 10/17/22,  TECHNIQUE: Routine. Additional postgadolinium T1 sequences were obtained.  IV CONTRAST: GADOBUTROL 1 MMOL/ML IV SOLN 10 mL    FINDINGS:    BONES:  -No fracture, bone contusion, or stress reaction. No concerning marrow  replacing lesion.    Large field-of-view images are not tailored to evaluate the elbow or wrist joints.    SOFT TISSUES:    -There is some ill-defined edema and enhancement in the subcutaneous tissue over the mid and dorsal aspect of the ulnar shaft, the degree of which appears decreased compared to the prior MRI from 04/06/2023. Findings are probably postoperative. No definite evidence for tumor recurrence.    MUSCLES:  -No muscle atrophy or edema.    IMPRESSION: Postoperative changes from prior tumor resection with some ill-defined edema and enhancement in the subcutaneous tissue over the dorsal mid/distal ulna which appears somewhat decreased in extent compared to the prior study and is likely postoperative. No definite masslike enhancing area to suggest tumor recurrence.          EXAM: CT CHEST ABD PELVIS W IV CONT  LOCATION: [facility]  DATE: 10/30/2023    INDICATION: High grade prostate cancer, Malignant neoplasm of prostate  COMPARISON: CT abdomen and pelvis 07/09/2022  TECHNIQUE: Helical thin-section CT scan of the chest, abdomen, and pelvis was performed following injection of IV contrast. Multiplanar reformats were obtained. Dose reduction techniques were used.  CONTRAST: IOHEXOL 350 MG/ML IV SOLN 100 mL    FINDINGS:  LUNGS AND PLEURA: No pulmonary nodules or mass lesions. Several calcified granulomas are visualized. Mild dependent atelectasis.    MEDIASTINUM/AXILLAE: No mediastinal or axillary adenopathy. Heart is normal in size. No pericardial effusion.    CORONARY ARTERY CALCIFICATION: Severe.    HEPATOBILIARY: Liver is unremarkable. Gallbladder has been resected.    PANCREAS: Normal.    SPLEEN: Normal.    ADRENAL GLANDS: Normal.    KIDNEYS/BLADDER: There is a low-attenuation lesion in the left kidney which is exophytic and measures 1.7 x 1.5 cm. This has Hounsfield units equal to 28 which is high for a simple cyst. This may be a mildly complex cyst. Recommend ultrasound in order to be certain  that there are not solid components present. Additional simple cysts are also visualized which do not require follow-up. Mild scarring involving the right kidney. Bladder is unremarkable.    BOWEL: Diverticulosis without evidence of diverticulitis. Normal appendix. Small bowel is unremarkable.    LYMPH NODES: No retroperitoneal adenopathy. No pelvic adenopathy.    VASCULATURE: Vascular calcification involving aorta. No aneurysm.    PELVIC ORGANS: Prostate is enlarged.    MUSCULOSKELETAL: Degenerative changes in the spine.    IMPRESSION:  1.  No CT evidence of metastatic disease.    2.  Prostate is enlarged.    3.  Diverticulosis without evidence of diverticulitis.    4.  Prior cholecystectomy.    5.  Low-attenuation lesion in the left kidney may be a complex cyst. Recommend ultrasound in order to be certain there is not a solid element.  Exam End: 10/30/23 10:12 AM    Specimen Collected: 10/30/23 10:12 AM Last Resulted: 10/30/23  3:44 PM   Received From: [facility]  Result Received: 11/01/23  8:26 AM        Assessment:  77M with L forearm UPS, status post wide excision tumor bed (11/15/2022).  Remains continuously disease-free.    Plan:  Activities as tolerated  Repeat MRI left forearm and CT chest before follow-up visit per surveillance protocol.    Velasquez Chawla MD  [facility] Family Professor  Oncology and Adult Reconstructive Surgery  Dept Orthopaedic Surgery, [facility]  [phone] office, [phone] pager  [email or web address]    Virtual-Visit Details    Type of service:  Video Visit  Visit total duration (including visit time, pre and post visit work time as documented above on the same day of service): 20  min  Video start time: 1115  Video end time:  1130  Non video work time: 5 min  Originating Location (pt. Location): Home  Distant Location (provider location):  [facility] ORTHOPEDIC [facility]   Platform used for Virtual Visit: [employer]

## 2023-11-09 ENCOUNTER — VIRTUAL VISIT (OUTPATIENT)
Dept: ORTHOPEDICS | Facility: CLINIC | Age: 78
End: 2023-11-09
Payer: MEDICARE

## 2023-11-09 DIAGNOSIS — C49.12 SARCOMA OF UPPER EXTREMITY, LEFT (H): Primary | ICD-10-CM

## 2023-11-09 PROCEDURE — 99213 OFFICE O/P EST LOW 20 MIN: CPT | Mod: VID | Performed by: ORTHOPAEDIC SURGERY

## 2023-11-09 NOTE — LETTER
11/9/2023         RE: Jose Tiwari  [address] [place] [address]        Dear Colleague,    Thank you for referring your patient, Jose Tiwari, to the [facility] ORTHOPEDIC CLINIC [place]. Please see a copy of my visit note below.        [facility], Orthopaedic Oncology Surgery Consultation  by Velasquez Chawla M.D.    Jose Tiwari MRN# [id number]    YOB: 1945     Requesting physician: Tre Mcintyre     DX:   77M with L forearm UPS , high grade localized  Prostate CA       TREATMENTS:  7/25/2022: Left forearm mass excision (Dr. 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  He has no pain, with the exception of intermittent pain when bumping into things.  He is overall happy with his progress.  He is able to perform all regular daily activities and finds he has no restrictions.  In fact he feels as if his function is no different than it was before his sarcoma cancer treatment    He has a new diagnosis of prostate carcinoma and is a undergone staging which revealed localized disease and is preparing for local treatment options.    Physical exam:  Limited exam due to virtual visit.  Patient reports that the incision is well-healed with no erythema, dehiscence, ecchymosis or discharge.  Sensation to light touch intact of the left forearm and hand.      MRI of the left forearm demonstrate no recurrence of the tumor  EXAM: MR RADIUS ULNA LT W/WO IV CONT  LOCATION: [facility]  DATE: 10/30/2023    INDICATION: 6 month follow up exam.  Follow up sarcoma of upper extremity, left, malignant neoplasm of connective and soft  tissues.  COMPARISON: 4/6/23, 10/17/22,  TECHNIQUE: Routine. Additional postgadolinium T1 sequences were obtained.  IV CONTRAST: GADOBUTROL 1 MMOL/ML IV SOLN 10 mL    FINDINGS:    BONES:  -No fracture, bone contusion, or stress reaction. No concerning marrow replacing lesion.    Large field-of-view images are not tailored to evaluate the elbow or wrist joints.    SOFT TISSUES:    -There is some ill-defined edema and enhancement in the subcutaneous tissue over the mid and dorsal aspect of the ulnar shaft, the degree of which appears decreased compared to the prior MRI from 04/06/2023. Findings are probably postoperative. No definite evidence for tumor recurrence.    MUSCLES:  -No muscle atrophy or edema.    IMPRESSION: Postoperative changes from prior tumor resection with some ill-defined edema and enhancement in the subcutaneous tissue over the dorsal mid/distal ulna which appears somewhat decreased in extent compared to the prior study and is likely postoperative. No definite masslike enhancing area to suggest tumor recurrence.          EXAM: CT CHEST ABD PELVIS W IV CONT  LOCATION: [facility]  DATE: 10/30/2023    INDICATION: High grade prostate cancer, Malignant neoplasm of prostate  COMPARISON: CT abdomen and pelvis 07/09/2022  TECHNIQUE: Helical thin-section CT scan of the chest, abdomen, and pelvis was performed following injection of IV contrast. Multiplanar reformats were obtained. Dose reduction techniques were used.  CONTRAST: IOHEXOL 350 MG/ML IV SOLN 100 mL    FINDINGS:  LUNGS AND PLEURA: No pulmonary nodules or mass lesions. Several calcified granulomas are visualized. Mild dependent atelectasis.    MEDIASTINUM/AXILLAE: No mediastinal or axillary adenopathy. Heart is normal in size. No pericardial effusion.    CORONARY ARTERY CALCIFICATION: Severe.    HEPATOBILIARY: Liver is unremarkable. Gallbladder has been resected.    PANCREAS: Normal.    SPLEEN: Normal.    ADRENAL GLANDS:  Normal.    KIDNEYS/BLADDER: There is a low-attenuation lesion in the left kidney which is exophytic and measures 1.7 x 1.5 cm. This has Hounsfield units equal to 28 which is high for a simple cyst. This may be a mildly complex cyst. Recommend ultrasound in order to be certain that there are not solid components present. Additional simple cysts are also visualized which do not require follow-up. Mild scarring involving the right kidney. Bladder is unremarkable.    BOWEL: Diverticulosis without evidence of diverticulitis. Normal appendix. Small bowel is unremarkable.    LYMPH NODES: No retroperitoneal adenopathy. No pelvic adenopathy.    VASCULATURE: Vascular calcification involving aorta. No aneurysm.    PELVIC ORGANS: Prostate is enlarged.    MUSCULOSKELETAL: Degenerative changes in the spine.    IMPRESSION:  1.  No CT evidence of metastatic disease.    2.  Prostate is enlarged.    3.  Diverticulosis without evidence of diverticulitis.    4.  Prior cholecystectomy.    5.  Low-attenuation lesion in the left kidney may be a complex cyst. Recommend ultrasound in order to be certain there is not a solid element.  Exam End: 10/30/23 10:12 AM    Specimen Collected: 10/30/23 10:12 AM Last Resulted: 10/30/23  3:44 PM   Received From: [employer]  Result Received: 11/01/23  8:26 AM        Assessment:  77M with L forearm UPS, status post wide excision tumor bed (11/15/2022).  Remains continuously disease-free.    Plan:  Activities as tolerated  Repeat MRI left forearm and CT chest before follow-up visit per surveillance protocol.    MD Abner Tucker Family Professor  Oncology and Adult Reconstructive Surgery  Dept Orthopaedic Surgery, [facility], located within [facility]  [phone] office, [phone] pager  [email or web address]    Virtual-Visit Details    Type of service:  Video Visit  Visit total duration (including visit time, pre and post visit work time as documented above on the same day of service): 20  min  Video start  time: 1115  Video end time:  1130  Non video work time: 5 min  Originating Location (pt. Location): Home  Distant Location (provider location):  [facility] ORTHOPEDIC [facility]   Platform used for Virtual Visit: [employer]

## 2024-02-20 ENCOUNTER — TELEPHONE (OUTPATIENT)
Dept: ORTHOPEDICS | Facility: CLINIC | Age: 79
End: 2024-02-20
Payer: MEDICARE

## 2024-02-20 NOTE — TELEPHONE ENCOUNTER
Writer spoke with patient who states he would like to have [facility].     Juliana Sandhu LPN

## 2024-02-21 NOTE — TELEPHONE ENCOUNTER
Orders for MRI left forearm and CT chest sent to [facility] fax:[phone]  Juliana Sandhu LPN

## 2024-02-22 NOTE — TELEPHONE ENCOUNTER
Called [facility] and patient is scheduled for Thursday 2/29.    Juliana Sandhu LPN

## 2024-02-29 NOTE — PROGRESS NOTES
[facility], Orthopaedic Oncology Surgery Consultation  by Velasquez Chawla M.D.    Jose Tiwari MRN# [id number]    YOB: 1945     Requesting physician: Tre Henson MD [facility]  Justin Mcintyre     DX:   77M with L forearm UPS , high grade localized  Prostate CA    TREATMENTS:  7/25/2022: Left forearm mass excision (Dr. 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  He has no pain, and is overall happy with his progress.  He is able to perform all regular daily activities and finds he has no restrictions.  In fact he feels as if his function is no different than it was before his sarcoma cancer treatment    He has a recent diagnosis of prostate carcinoma and is undergoing external beam radiation for local control as he was not felt to be a surgical candidate.    Physical exam:  Limited exam due to virtual visit.  Patient reports that the incision is well-healed with no erythema, dehiscence, ecchymosis or discharge.  Sensation to light touch intact of the left forearm and hand.      MRI of the left forearm demonstrate no recurrence of the tumor  CT scan of the lungs demonstrates no evidence of pulmonary metastatic disease.       Assessment:  Excellent oncologic and functional outcome with continuous disease free survival L forearm UPS, status post wide excision tumor bed (11/15/2022).      Plan:  Activities as tolerated  Repeat MRI left forearm and CT chest before follow-up visit per surveillance protocol.    Velasquez Chawla MD  [facility] Family Professor  Oncology and Adult Reconstructive Surgery  Dept Orthopaedic Surgery, [facility]  [phone] office, [phone]  pager  [email or web address]    Virtual-Visit Details    Type of service:  Video Visit  Visit total duration (including visit time, pre and post visit work time as documented above on the same day of service): 20  min  Video start time: 1600  Video end time:  1620  Originating Location (pt. Location): Home  Distant Location (provider location):  [facility] ORTHOPEDIC [facility]   Platform used for Virtual Visit: [employer]

## 2024-03-14 ENCOUNTER — VIRTUAL VISIT (OUTPATIENT)
Dept: ORTHOPEDICS | Facility: CLINIC | Age: 79
End: 2024-03-14
Payer: MEDICARE

## 2024-03-14 VITALS — WEIGHT: 275 LBS | BODY MASS INDEX: 37.25 KG/M2 | HEIGHT: 72 IN

## 2024-03-14 DIAGNOSIS — C49.12 SARCOMA OF UPPER EXTREMITY, LEFT (H): Primary | ICD-10-CM

## 2024-03-14 PROCEDURE — 99213 OFFICE O/P EST LOW 20 MIN: CPT | Mod: 95 | Performed by: ORTHOPAEDIC SURGERY

## 2024-03-14 ASSESSMENT — PAIN SCALES - GENERAL: PAINLEVEL: NO PAIN (0)

## 2024-03-14 NOTE — LETTER
3/14/2024         RE: Jose Tiwari  [address] [place] [id number]        Dear Colleague,    Thank you for referring your patient, Jose Tiwari, to the [facility] ORTHOPEDIC CLINIC [place]. Please see a copy of my visit note below.            [facility], Orthopaedic Oncology Surgery Consultation  by Velasquez Chawla M.D.    Jose Tiwari MRN# [id number]    YOB: 1945     Requesting physician: Tre Henson MD [facility]  Justin Mcintyre     DX:   77M with L forearm UPS , high grade localized  Prostate CA    TREATMENTS:  7/25/2022: Left forearm mass excision (Dr. 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  He has no pain, and is overall happy with his progress.  He is able to perform all regular daily activities and finds he has no restrictions.  In fact he feels as if his function is no different than it was before his sarcoma cancer treatment    He has a recent diagnosis of prostate carcinoma and is undergoing external beam radiation for local control as he was not felt to be a surgical candidate.    Physical exam:  Limited exam due to virtual visit.  Patient reports that the incision is well-healed with no erythema, dehiscence, ecchymosis or discharge.  Sensation to light touch intact of the left forearm and hand.      MRI of the left forearm demonstrate no recurrence of the tumor  CT scan of the lungs demonstrates no evidence of pulmonary metastatic disease.       Assessment:  Excellent oncologic and functional outcome with continuous disease free survival L forearm UPS, status post wide excision tumor bed (11/15/2022).      Plan:  Activities as tolerated  Repeat  MRI left forearm and CT chest before follow-up visit per surveillance protocol.    MD Abner Tucker Family Professor  Oncology and Adult Reconstructive Surgery  Dept Orthopaedic Surgery, [facility]  [phone] office, [phone] pager  [email or web address]    Virtual-Visit Details    Type of service:  Video Visit  Visit total duration (including visit time, pre and post visit work time as documented above on the same day of service): 20  min  Video start time: 1600  Video end time:  1620  Originating Location (pt. Location): Home  Distant Location (provider location):  [facility] ORTHOPEDIC [facility]   Platform used for Virtual Visit: [employer]

## 2024-03-14 NOTE — LETTER
3/14/2024       RE: Jose Tiwari  [address] [facility] [address]     Dear Colleague,    Thank you for referring your patient, Jose Tiwari, to the [facility] ORTHOPEDIC CLINIC [place] [facility]. Please see a copy of my visit note below.            [facility], Orthopaedic Oncology Surgery Consultation  by Velasquez Chawla M.D.    Jose Tiwari MRN# [id number]    YOB: 1945     Requesting physician: Tre Henson MD [facility]  Justin Mcintyre     DX:   77M with L forearm UPS , high grade localized  Prostate CA    TREATMENTS:  7/25/2022: Left forearm mass excision (Dr. 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  He has no pain, and is overall happy with his progress.  He is able to perform all regular daily activities and finds he has no restrictions.  In fact he feels as if his function is no different than it was before his sarcoma cancer treatment    He has a recent diagnosis of prostate carcinoma and is undergoing external beam radiation for local control as he was not felt to be a surgical candidate.    Physical exam:  Limited exam due to virtual visit.  Patient reports that the incision is well-healed with no erythema, dehiscence, ecchymosis or discharge.  Sensation to light touch intact of the left forearm and hand.      MRI of the left forearm demonstrate no recurrence of the tumor  CT scan of the lungs demonstrates no evidence of pulmonary metastatic disease.       Assessment:  Excellent oncologic and functional outcome with continuous disease free survival L forearm UPS, status post wide excision tumor bed  (11/15/2022).      Plan:  Activities as tolerated  Repeat MRI left forearm and CT chest before follow-up visit per surveillance protocol.    Velasquez Chawla MD  [facility] Family Professor  Oncology and Adult Reconstructive Surgery  Dept Orthopaedic Surgery, [facility]  [phone] office, [phone] pager  [email or web address]    Virtual-Visit Details    Type of service:  Video Visit  Visit total duration (including visit time, pre and post visit work time as documented above on the same day of service): 20  min  Video start time: 1600  Video end time:  1620  Originating Location (pt. Location): Home  Distant Location (provider location):  [facility] ORTHOPEDIC [facility]   Platform used for Virtual Visit: [employer]                  Again, thank you for allowing me to participate in the care of your patient.      Sincerely,    Velasquez Chawla MD

## 2024-03-14 NOTE — NURSING NOTE
Is the patient currently in the state of [place]? YES    Visit mode:VIDEO    If the visit is dropped, the patient can be reconnected by: VIDEO VISIT: Text to cell phone:   Telephone Information:   Mobile [phone]       Will anyone else be joining the visit? NO  (If patient encounters technical issues they should call [phone]    How would you like to obtain your AVS? MyChart    Are changes needed to the allergy or medication list? No    Reason for visit: RECHECK    Medications and allergies have been reviewed.      Jonah BENTON

## 2024-07-11 ENCOUNTER — TELEPHONE (OUTPATIENT)
Dept: ORTHOPEDICS | Facility: CLINIC | Age: 79
End: 2024-07-11
Payer: MEDICARE

## 2024-07-11 NOTE — TELEPHONE ENCOUNTER
----- Message from Ana BURTON sent at 7/11/2024  9:45 AM CDT -----  Needs MRI and CT and UMP Return visit with Dr. Chawla as soon as able

## 2024-07-11 NOTE — TELEPHONE ENCOUNTER
Patient confirmed scheduled appointment:  Date: 8/15/24  Time: 3:55  Visit type: Telephone visit return  Provider: Dr. Chawla  Would like imaging orders sent to [facility] in [place], [place]. Sent to [facility].

## 2024-07-12 ENCOUNTER — DOCUMENTATION ONLY (OUTPATIENT)
Dept: ORTHOPEDICS | Facility: CLINIC | Age: 79
End: 2024-07-12
Payer: MEDICARE

## 2024-07-12 NOTE — TELEPHONE ENCOUNTER
Spoke with patient to confirm that 8/15/24 appt will be video with Dr. Chawla, not telephone, as he does not do telephone appts.

## 2024-08-05 NOTE — PROGRESS NOTES
[facility], Orthopaedic Oncology Surgery Consultation  by Velasquez Chawla M.D.    Jose Tiwari MRN# [id number]    YOB: 1945     Requesting physician: Tre Henson MD [facility]  Justin Mcintyre     DX:   77M with L forearm UPS , high grade localized, 3.2 cm   Prostate CA, localized.      TREATMENTS:  7/25/2022: Left forearm mass excision (Dr. 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  He has no pain, and is overall happy with his progress.  He is able to perform all regular daily activities and finds he has no restrictions.  In fact he feels as if his function is no different than it was before his sarcoma cancer treatment    Physical exam:  Limited exam due to virtual visit.  Patient reports that the incision is well-healed with no erythema, dehiscence, ecchymosis or discharge.  Sensation to light touch intact of the left forearm and hand.      MRI of the left forearm 8/8/24,  demonstrate no recurrence of the tumor  CT scan of the lungs 8/8/24 demonstrates no evidence of pulmonary metastatic disease.     Assessment:  2 years continuously disease-free survival.  Excellent oncologic and functional outcome with continuous disease free survival L forearm UPS, status post wide excision tumor bed (11/15/2022).      Plan:  Activities as tolerated  Repeat MRI left forearm and CT chest before follow-up visit per surveillance protocol.  Clinic staff will contact patient and arrange    Velasquez Chawla MD  [facility] Family Professor  Oncology and Adult Reconstructive Surgery  Dept Orthopaedic Surgery, [facility]  Physicians  [phone] office, [phone] pager  [email or web address]    Virtual-Visit Details    Type of service:  Video Visit  Visit total duration (including visit time, pre and post visit work time as documented above on the same day of service): 20  min  Video start time: 1550  Video end time:  1605  Non video work time 5  Originating Location (pt. Location): Home  Distant Location (provider location):  [facility] ORTHOPEDIC [facility]   Platform used for Virtual Visit: [employer]

## 2024-08-15 ENCOUNTER — VIRTUAL VISIT (OUTPATIENT)
Dept: ORTHOPEDICS | Facility: CLINIC | Age: 79
End: 2024-08-15
Payer: MEDICARE

## 2024-08-15 VITALS — HEIGHT: 72 IN | WEIGHT: 275 LBS | BODY MASS INDEX: 37.25 KG/M2

## 2024-08-15 DIAGNOSIS — C49.12 SARCOMA OF UPPER EXTREMITY, LEFT (H): Primary | ICD-10-CM

## 2024-08-15 PROCEDURE — 99213 OFFICE O/P EST LOW 20 MIN: CPT | Mod: 95 | Performed by: ORTHOPAEDIC SURGERY

## 2024-08-15 ASSESSMENT — PAIN SCALES - GENERAL: PAINLEVEL: NO PAIN (0)

## 2024-08-15 NOTE — NURSING NOTE
Current patient location:  [address] per pt      Is the patient currently in the state of [place]? YES    Visit mode:VIDEO    If the visit is dropped, the patient can be reconnected by: VIDEO VISIT: Text to cell phone:   Telephone Information:   Mobile [phone]       Will anyone else be joining the visit? NO  (If patient encounters technical issues they should call [phone]    How would you like to obtain your AVS? MyChart    Are changes needed to the allergy or medication list? No    Are refills needed on medications prescribed by this physician? NO    Rooming Documentation:  Questionnaire(s) completed      Reason for visit: RECHMARTHA BENTON

## 2024-08-15 NOTE — LETTER
8/15/2024      Jose Tiwari  [address] [place] [id number]      Dear Colleague,    Thank you for referring your patient, Jose Tiwari, to the [facility] [place]. Please see a copy of my visit note below.            [facility], Orthopaedic Oncology Surgery Consultation  by Velasquez Chawla M.D.    Jose Tiwari MRN# [id number]    YOB: 1945     Requesting physician: Tre Henson MD [facility]  Justin Mcintyre     DX:   77M with L forearm UPS , high grade localized, 3.2 cm   Prostate CA, localized.      TREATMENTS:  7/25/2022: Left forearm mass excision (Dr. 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  He has no pain, and is overall happy with his progress.  He is able to perform all regular daily activities and finds he has no restrictions.  In fact he feels as if his function is no different than it was before his sarcoma cancer treatment    Physical exam:  Limited exam due to virtual visit.  Patient reports that the incision is well-healed with no erythema, dehiscence, ecchymosis or discharge.  Sensation to light touch intact of the left forearm and hand.      MRI of the left forearm 8/8/24,  demonstrate no recurrence of the tumor  CT scan of the lungs 8/8/24 demonstrates no evidence of pulmonary metastatic disease.     Assessment:  2 years continuously disease-free survival.  Excellent oncologic and functional outcome with continuous disease free survival L forearm UPS, status post wide excision tumor bed (11/15/2022).      Plan:  Activities as tolerated  Repeat MRI left forearm and CT chest  before follow-up visit per surveillance protocol.  Clinic staff will contact patient and arrange    MD Abner Tucker Family Professor  Oncology and Adult Reconstructive Surgery  Dept Orthopaedic Surgery, [facility]  [phone] office, [phone] pager  [email or web address]    Virtual-Visit Details    Type of service:  Video Visit  Visit total duration (including visit time, pre and post visit work time as documented above on the same day of service): 20  min  Video start time: 1550  Video end time:  1605  Non video work time 5  Originating Location (pt. Location): Home  Distant Location (provider location):  [facility] ORTHOPEDIC [facility]   Platform used for Virtual Visit: [employer]                  Again, thank you for allowing me to participate in the care of your patient.        Sincerely,        Velasquez Chawla MD

## 2024-08-15 NOTE — LETTER
8/15/2024         RE: Jose Tiwari  [address] [facility] [address]              [facility], Orthopaedic Oncology Surgery Consultation  by Velasquez Chawla M.D.    Jose Tiwari MRN# [id number]    YOB: 1945     Requesting physician: Tre Henson MD [facility]  Justin Mcintyre     DX:   77M with L forearm UPS , high grade localized, 3.2 cm   Prostate CA, localized.      TREATMENTS:  7/25/2022: Left forearm mass excision (Dr. 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  He has no pain, and is overall happy with his progress.  He is able to perform all regular daily activities and finds he has no restrictions.  In fact he feels as if his function is no different than it was before his sarcoma cancer treatment    Physical exam:  Limited exam due to virtual visit.  Patient reports that the incision is well-healed with no erythema, dehiscence, ecchymosis or discharge.  Sensation to light touch intact of the left forearm and hand.      MRI of the left forearm 8/8/24,  demonstrate no recurrence of the tumor  CT scan of the lungs 8/8/24 demonstrates no evidence of pulmonary metastatic disease.     Assessment:  2 years continuously disease-free survival.  Excellent oncologic and functional outcome with continuous disease free survival L forearm UPS, status post wide excision tumor bed (11/15/2022).      Plan:  Activities as tolerated  Repeat MRI left forearm and CT chest before follow-up visit per surveillance protocol.  Clinic staff will contact patient and arrange    Velasquez Chawla MD  [facility]  Oncology and Adult  Reconstructive Surgery  Dept Orthopaedic Surgery, [facility] Physicians  [phone] office, [phone] pager  [email or web address]    Virtual-Visit Details    Type of service:  Video Visit  Visit total duration (including visit time, pre and post visit work time as documented above on the same day of service): 20  min  Video start time: 1550  Video end time:  1605  Non video work time 5  Originating Location (pt. Location): Home  Distant Location (provider location):  [facility] ORTHOPEDIC [facility]   Platform used for Virtual Visit: Nieves Chawla MD

## 2024-12-14 ENCOUNTER — HEALTH MAINTENANCE LETTER (OUTPATIENT)
Age: 79
End: 2024-12-14

## (undated) DEVICE — SUCTION MANIFOLD NEPTUNE 2 SYS 4 PORT 0702-020-000

## (undated) DEVICE — LINEN ORTHO PACK 5446

## (undated) DEVICE — BLADE KNIFE SURG 10 371110

## (undated) DEVICE — SUCTION TIP YANKAUER STR K87

## (undated) DEVICE — LIGHT HANDLE X1 31140133

## (undated) DEVICE — SOL NACL 0.9% IRRIG 1000ML BOTTLE 2F7124

## (undated) DEVICE — CAST PLASTER SPLINT 4X15" 7394

## (undated) DEVICE — GLOVE BIOGEL PI MICRO INDICATOR UNDERGLOVE SZ 8.5 48985

## (undated) DEVICE — SU PDS II 2-0 SH 27" Z317H

## (undated) DEVICE — ESU PENCIL W/HOLSTER E2350H

## (undated) DEVICE — MARKER MARGIN MARKER STD 6 COLOR SGL USE MMS6

## (undated) DEVICE — SUCTION FRAZIER 12FR W/HANDLE K73

## (undated) DEVICE — SOL WATER IRRIG 1000ML BOTTLE 2F7114

## (undated) DEVICE — LINEN GOWN OVERSIZE 5408

## (undated) DEVICE — GOWN IMPERVIOUS SPECIALTY XLG/XLONG 32474

## (undated) DEVICE — BNDG ELASTIC 3"X5YDS STERILE 6611-3S

## (undated) DEVICE — SU PDS II 3-0 PS-2 18" Z497G

## (undated) DEVICE — GLOVE BIOGEL PI MICRO INDICATOR UNDERGLOVE SZ 7.5 48975

## (undated) DEVICE — GLOVE BIOGEL PI MICRO SZ 7.0 48570

## (undated) DEVICE — PREP DURAPREP 26ML APL 8630

## (undated) DEVICE — ESU GROUND PAD UNIVERSAL W/O CORD

## (undated) DEVICE — LINEN GOWN X4 5410

## (undated) DEVICE — GLOVE BIOGEL PI SZ 8.5 40885

## (undated) DEVICE — PREP POVIDONE-IODINE 7.5% SCRUB 4OZ BOTTLE MDS093945

## (undated) DEVICE — PREP DURAPREP REMOVER 4OZ 8611

## (undated) DEVICE — SU SILK 2-0 SH 30" K833H

## (undated) DEVICE — CAST PADDING 4" STERILE 9044S

## (undated) DEVICE — SU SILK 2-0 TIE 12X30" A305H

## (undated) DEVICE — DRAPE IOBAN INCISE 23X17" 6650EZ

## (undated) DEVICE — DRAPE STOCKINETTE 4" 8544

## (undated) DEVICE — PREP SKIN SCRUB TRAY 4461A

## (undated) DEVICE — Device

## (undated) DEVICE — STRAP KNEE/BODY 31143004

## (undated) DEVICE — SPECIMEN CONTAINER 5OZ STERILE 2600SA

## (undated) DEVICE — SU PDS II 3-0 SH 27" CLR Z416H

## (undated) DEVICE — LINEN TOWEL PACK X5 5464

## (undated) RX ORDER — ONDANSETRON 2 MG/ML
INJECTION INTRAMUSCULAR; INTRAVENOUS
Status: DISPENSED
Start: 2022-11-15

## (undated) RX ORDER — OXYCODONE HYDROCHLORIDE 5 MG/1
TABLET ORAL
Status: DISPENSED
Start: 2022-11-15

## (undated) RX ORDER — ACETAMINOPHEN 325 MG/1
TABLET ORAL
Status: DISPENSED
Start: 2022-11-15

## (undated) RX ORDER — LIDOCAINE HYDROCHLORIDE 20 MG/ML
INJECTION, SOLUTION EPIDURAL; INFILTRATION; INTRACAUDAL; PERINEURAL
Status: DISPENSED
Start: 2022-11-15

## (undated) RX ORDER — FENTANYL CITRATE-0.9 % NACL/PF 10 MCG/ML
PLASTIC BAG, INJECTION (ML) INTRAVENOUS
Status: DISPENSED
Start: 2022-11-15

## (undated) RX ORDER — PROPOFOL 10 MG/ML
INJECTION, EMULSION INTRAVENOUS
Status: DISPENSED
Start: 2022-11-15

## (undated) RX ORDER — DEXAMETHASONE SODIUM PHOSPHATE 4 MG/ML
INJECTION, SOLUTION INTRA-ARTICULAR; INTRALESIONAL; INTRAMUSCULAR; INTRAVENOUS; SOFT TISSUE
Status: DISPENSED
Start: 2022-11-15

## (undated) RX ORDER — HYDROMORPHONE HCL IN WATER/PF 6 MG/30 ML
PATIENT CONTROLLED ANALGESIA SYRINGE INTRAVENOUS
Status: DISPENSED
Start: 2022-11-15

## (undated) RX ORDER — FENTANYL CITRATE 50 UG/ML
INJECTION, SOLUTION INTRAMUSCULAR; INTRAVENOUS
Status: DISPENSED
Start: 2022-11-15